# Patient Record
Sex: MALE | Race: WHITE | NOT HISPANIC OR LATINO | Employment: FULL TIME | URBAN - METROPOLITAN AREA
[De-identification: names, ages, dates, MRNs, and addresses within clinical notes are randomized per-mention and may not be internally consistent; named-entity substitution may affect disease eponyms.]

---

## 2017-01-16 DIAGNOSIS — I10 HTN (HYPERTENSION), BENIGN: ICD-10-CM

## 2017-01-18 RX ORDER — SIMVASTATIN 40 MG
TABLET ORAL
Qty: 90 TABLET | Refills: 3 | Status: SHIPPED | OUTPATIENT
Start: 2017-01-18 | End: 2017-02-14 | Stop reason: ALTCHOICE

## 2017-01-18 RX ORDER — HYDROCHLOROTHIAZIDE 25 MG/1
TABLET ORAL
Qty: 30 TABLET | Refills: 6 | Status: SHIPPED | OUTPATIENT
Start: 2017-01-18 | End: 2017-08-26 | Stop reason: SDUPTHER

## 2017-02-14 ENCOUNTER — OFFICE VISIT (OUTPATIENT)
Dept: FAMILY MEDICINE CLINIC | Facility: CLINIC | Age: 57
End: 2017-02-14

## 2017-02-14 VITALS
WEIGHT: 272.1 LBS | HEIGHT: 72 IN | HEART RATE: 84 BPM | BODY MASS INDEX: 36.85 KG/M2 | SYSTOLIC BLOOD PRESSURE: 142 MMHG | OXYGEN SATURATION: 97 % | DIASTOLIC BLOOD PRESSURE: 102 MMHG

## 2017-02-14 DIAGNOSIS — R79.89 LOW TESTOSTERONE: ICD-10-CM

## 2017-02-14 DIAGNOSIS — I10 HTN (HYPERTENSION), BENIGN: Primary | ICD-10-CM

## 2017-02-14 PROCEDURE — 99214 OFFICE O/P EST MOD 30 MIN: CPT | Performed by: INTERNAL MEDICINE

## 2017-02-14 RX ORDER — IRBESARTAN 300 MG/1
300 TABLET ORAL DAILY
Qty: 30 TABLET | Refills: 5 | Status: SHIPPED | OUTPATIENT
Start: 2017-02-14 | End: 2017-09-19 | Stop reason: SDUPTHER

## 2017-02-14 RX ORDER — ATORVASTATIN CALCIUM 20 MG/1
20 TABLET, FILM COATED ORAL DAILY
Qty: 30 TABLET | Refills: 5 | Status: SHIPPED | OUTPATIENT
Start: 2017-02-14 | End: 2017-08-26 | Stop reason: SDUPTHER

## 2017-02-14 RX ORDER — DILTIAZEM HYDROCHLORIDE 360 MG/1
360 CAPSULE, EXTENDED RELEASE ORAL DAILY
Qty: 30 CAPSULE | Refills: 5 | Status: SHIPPED | OUTPATIENT
Start: 2017-02-14 | End: 2017-06-27 | Stop reason: SDUPTHER

## 2017-05-10 DIAGNOSIS — E78.5 HYPERLIPIDEMIA, UNSPECIFIED HYPERLIPIDEMIA TYPE: ICD-10-CM

## 2017-05-10 DIAGNOSIS — I10 HTN (HYPERTENSION), BENIGN: Primary | ICD-10-CM

## 2017-05-15 ENCOUNTER — RESULTS ENCOUNTER (OUTPATIENT)
Dept: FAMILY MEDICINE CLINIC | Facility: CLINIC | Age: 57
End: 2017-05-15

## 2017-05-15 DIAGNOSIS — E78.5 HYPERLIPIDEMIA, UNSPECIFIED HYPERLIPIDEMIA TYPE: ICD-10-CM

## 2017-05-15 DIAGNOSIS — I10 HTN (HYPERTENSION), BENIGN: ICD-10-CM

## 2017-05-18 LAB
ALBUMIN SERPL-MCNC: 4.7 G/DL (ref 3.5–5.2)
ALBUMIN/GLOB SERPL: 1.7 G/DL
ALP SERPL-CCNC: 116 U/L (ref 39–117)
ALT SERPL-CCNC: 47 U/L (ref 1–41)
AST SERPL-CCNC: 31 U/L (ref 1–40)
BILIRUB SERPL-MCNC: 0.9 MG/DL (ref 0.1–1.2)
BUN SERPL-MCNC: 14 MG/DL (ref 6–20)
BUN/CREAT SERPL: 12.1 (ref 7–25)
CALCIUM SERPL-MCNC: 9.9 MG/DL (ref 8.6–10.5)
CHLORIDE SERPL-SCNC: 100 MMOL/L (ref 98–107)
CHOLEST SERPL-MCNC: 178 MG/DL (ref 0–200)
CO2 SERPL-SCNC: 24.3 MMOL/L (ref 22–29)
CREAT SERPL-MCNC: 1.16 MG/DL (ref 0.76–1.27)
GLOBULIN SER CALC-MCNC: 2.7 GM/DL
GLUCOSE SERPL-MCNC: 103 MG/DL (ref 65–99)
HDLC SERPL-MCNC: 48 MG/DL (ref 40–60)
LDLC SERPL CALC-MCNC: 99 MG/DL (ref 0–100)
POTASSIUM SERPL-SCNC: 3.9 MMOL/L (ref 3.5–5.2)
PROT SERPL-MCNC: 7.4 G/DL (ref 6–8.5)
SODIUM SERPL-SCNC: 140 MMOL/L (ref 136–145)
TRIGL SERPL-MCNC: 157 MG/DL (ref 0–150)
VLDLC SERPL CALC-MCNC: 31.4 MG/DL (ref 5–40)

## 2017-06-27 ENCOUNTER — OFFICE VISIT (OUTPATIENT)
Dept: FAMILY MEDICINE CLINIC | Facility: CLINIC | Age: 57
End: 2017-06-27

## 2017-06-27 VITALS
SYSTOLIC BLOOD PRESSURE: 146 MMHG | BODY MASS INDEX: 37.76 KG/M2 | HEART RATE: 92 BPM | WEIGHT: 278.8 LBS | OXYGEN SATURATION: 97 % | HEIGHT: 72 IN | DIASTOLIC BLOOD PRESSURE: 96 MMHG

## 2017-06-27 DIAGNOSIS — R06.83 SNORING: ICD-10-CM

## 2017-06-27 DIAGNOSIS — R40.0 DAYTIME SOMNOLENCE: ICD-10-CM

## 2017-06-27 DIAGNOSIS — R79.89 LOW TESTOSTERONE: ICD-10-CM

## 2017-06-27 DIAGNOSIS — I10 HTN (HYPERTENSION), BENIGN: Primary | ICD-10-CM

## 2017-06-27 PROCEDURE — 99213 OFFICE O/P EST LOW 20 MIN: CPT | Performed by: INTERNAL MEDICINE

## 2017-06-27 RX ORDER — DILTIAZEM HYDROCHLORIDE 240 MG/1
240 CAPSULE, COATED, EXTENDED RELEASE ORAL 2 TIMES DAILY
Qty: 60 CAPSULE | Refills: 5 | Status: SHIPPED | OUTPATIENT
Start: 2017-06-27 | End: 2018-01-02 | Stop reason: SDUPTHER

## 2017-08-14 ENCOUNTER — HOSPITAL ENCOUNTER (OUTPATIENT)
Dept: SLEEP MEDICINE | Facility: HOSPITAL | Age: 57
Discharge: HOME | End: 2017-08-14
Attending: INTERNAL MEDICINE | Admitting: INTERNAL MEDICINE

## 2017-08-14 ENCOUNTER — TRANSCRIBE ORDERS (OUTPATIENT)
Dept: PULMONOLOGY | Facility: HOSPITAL | Age: 57
End: 2017-08-14

## 2017-08-14 DIAGNOSIS — R06.83 SNORING: ICD-10-CM

## 2017-08-14 DIAGNOSIS — G47.10 HYPERSOMNIA: Primary | ICD-10-CM

## 2017-08-14 DIAGNOSIS — R40.0 DAYTIME SOMNOLENCE: ICD-10-CM

## 2017-08-14 PROCEDURE — G0463 HOSPITAL OUTPT CLINIC VISIT: HCPCS

## 2017-08-15 NOTE — CONSULTS
Group: [facility]         CONSULT NOTE    Patient Identification:  Melchor Conrad Jr.  56 y.o.  male  1960  [id number]            Requesting physician: Dr. delgado    Reason for Consultation:  Hypersomnia evaluation    CC:     History of Present Illness:  Very pleasant 56-year-old gentleman here for evaluation of excessive snoring and witnessed apnea.  Patient feels the last few years the symptoms have been worse.  Complains of feeling unrested and sleepy as the day progresses.  Reports positive weight gain of 30 pounds in the last 2 years.  No parasomnias reported.  Occasional alcohol use reported.  Snoring is in all positions.  Sleep schedule with Enterobacter and 30 gets up at 5:30 gets about 7 hours of sleep and still feels tired.      Review of Systems  [place] 6 out of 24 within normal limits  Review of system positive for nasal congestion painful joints cough rest of the 12 point review of system negative  Past Medical History:  Past Medical History:   Diagnosis Date   • Elbow fracture 09/2014   • HLD (hyperlipidemia) 7/5/2016   • HTN (hypertension), benign 7/5/2016   • Radial head fracture 09/2014       Past Surgical History:  Past Surgical History:   Procedure Laterality Date   • HAND RECONSTRUCTION Right    • HEMORRHOIDECTOMY     • SHOULDER SURGERY Left    • SPINAL FUSION  12/2011    Lower Back   • VASECTOMY          Home Meds:    (Not in a hospital admission)  ibersartin and torvastatin HCTZ and Cartia  Allergies:  No Known Allergies    Social History:   Social History     Social History   • Marital status:      Spouse name: N/A   • Number of children: N/A   • Years of education: N/A     Occupational History   • Not on file.     Social History Main Topics   • Smoking status: Former Smoker     Packs/day: 1.50     Years: 6.00   • Smokeless tobacco: Never Used   • Alcohol use 3.0 oz/week     5 Standard drinks or equivalent per week   • Drug use: No   • Sexual activity: Not on  file     Other Topics Concern   • Not on file     Social History Narrative       Family History:  Family History   Problem Relation Age of Onset   • Hypertension Mother    • Osteoarthritis Mother    • Prostate cancer Father    • Alzheimer's disease Father      SDAT   • Ulcers Father      PEPTIC       Physical Exam:  There were no vitals taken for this visit. There is no height or weight on file to calculate BMI.      Physical Exam  Middle-aged gentleman in no distress no labored breathing  ENT Mallampati between 3 and 4  Neck is supple no bruit no adenopathy  Chest clear   CVS regular rate and rhythm no murmurs or gallop  Abdomen is soft nontender bowel sounds positive  Extremities trace edema the sinuses no clubbing  CNS no deficits  No hallucination and delusional joint deformities or skin rashes    Lab Review:   LABS:  Lab Results   Component Value Date    CALCIUM 9.9 05/18/2017       No results found for: CKTOTAL, CKMB, CKMBINDEX, TROPONINI, TROPONINT                    Lab Results   Component Value Date    TSH 1.690 06/01/2016     CrCl cannot be calculated (Patient's most recent sCr result is older than the maximum 30 days allowed.).         Imaging: I personally visualized the images of scans/x-rays performed within last 3 days.      Assessment:  Hypersomnia with comorbidities of hypertension and obesity  Recommendations:  Based on clinical presentation sleep apnea is suspected  Discussed pathophysiology and consequences of untreated sleep apnea  Patient agreeable wishing to proceed for a home sleep study  Sleep hygiene measures are discussed in detail  Follow-up and make further recommendations after home sleep study.          Franc Doshi MD  8/14/2017  9:59 PM      Much of this encounter note is an electronic transcription/translation of spoken language to printed text using Dragon Software.

## 2017-08-26 DIAGNOSIS — I10 HTN (HYPERTENSION), BENIGN: ICD-10-CM

## 2017-08-28 RX ORDER — ATORVASTATIN CALCIUM 20 MG/1
TABLET, FILM COATED ORAL
Qty: 30 TABLET | Refills: 6 | Status: SHIPPED | OUTPATIENT
Start: 2017-08-28 | End: 2018-04-05 | Stop reason: SDUPTHER

## 2017-08-28 RX ORDER — HYDROCHLOROTHIAZIDE 25 MG/1
TABLET ORAL
Qty: 30 TABLET | Refills: 6 | Status: SHIPPED | OUTPATIENT
Start: 2017-08-28 | End: 2018-04-05 | Stop reason: SDUPTHER

## 2017-09-11 ENCOUNTER — HOSPITAL ENCOUNTER (OUTPATIENT)
Dept: SLEEP MEDICINE | Facility: HOSPITAL | Age: 57
Discharge: HOME | End: 2017-09-11
Admitting: INTERNAL MEDICINE

## 2017-09-11 DIAGNOSIS — G47.10 HYPERSOMNIA: ICD-10-CM

## 2017-09-11 PROCEDURE — 95806 SLEEP STUDY UNATT&RESP EFFT: CPT

## 2017-09-19 DIAGNOSIS — I10 HTN (HYPERTENSION), BENIGN: ICD-10-CM

## 2017-09-19 RX ORDER — IRBESARTAN 300 MG/1
TABLET ORAL
Qty: 30 TABLET | Refills: 0 | Status: SHIPPED | OUTPATIENT
Start: 2017-09-19 | End: 2017-10-25 | Stop reason: SDUPTHER

## 2017-09-25 ENCOUNTER — TELEPHONE (OUTPATIENT)
Dept: SLEEP MEDICINE | Facility: HOSPITAL | Age: 57
End: 2017-09-25

## 2017-09-29 ENCOUNTER — OFFICE VISIT (OUTPATIENT)
Dept: FAMILY MEDICINE CLINIC | Facility: CLINIC | Age: 57
End: 2017-09-29

## 2017-09-29 VITALS
BODY MASS INDEX: 37.46 KG/M2 | OXYGEN SATURATION: 98 % | HEART RATE: 78 BPM | SYSTOLIC BLOOD PRESSURE: 134 MMHG | WEIGHT: 276.6 LBS | HEIGHT: 72 IN | DIASTOLIC BLOOD PRESSURE: 92 MMHG

## 2017-09-29 DIAGNOSIS — M25.512 ACUTE PAIN OF LEFT SHOULDER: ICD-10-CM

## 2017-09-29 DIAGNOSIS — G47.33 OSA (OBSTRUCTIVE SLEEP APNEA): ICD-10-CM

## 2017-09-29 DIAGNOSIS — I10 HTN (HYPERTENSION), BENIGN: Primary | ICD-10-CM

## 2017-09-29 PROCEDURE — 99214 OFFICE O/P EST MOD 30 MIN: CPT | Performed by: INTERNAL MEDICINE

## 2017-09-29 NOTE — PROGRESS NOTES
Subjective   Melchor Conrad Jr. is a 57 y.o. male who presents today for:    Hypertension (3 month f/u)    Hypertension   This is a chronic problem. The current episode started more than 1 year ago. The problem has been waxing and waning since onset. The problem is controlled. Pertinent negatives include no anxiety, blurred vision, chest pain, headaches, malaise/fatigue, neck pain, orthopnea, palpitations, peripheral edema, PND, shortness of breath or sweats. Agents associated with hypertension include NSAIDs. Risk factors for coronary artery disease include obesity and stress. Compliance problems include exercise.       He has had elevated BP since I first saw him in 2003 shortly after being turned away from donating blood. He has been on atenolol, Avapro, HCTZ, and lisinopril. He had been on atenolol and Zetoretic but developed a dry cough that resolved once the ACE-I was stopped. No problems with the BP med changes (from lisinopril to irbesartan, in 10/216), nor with a change in atenolol to Diltiazem (to see if his energy levels would improve off the B-blocker). It turned out he had a testosterone deficiency; energy levels did not improve with the medication change.       Readings from blood pressure checks at home were reviewed with him today.  Results of a recent sleep study showing severe MADHU were also reviewed; treatment is on the horizon (next Wednesday).    Left shoulder pain: This is a new problem.  He reports onset around May, after using it more following his right elbow repair.  He has a h/o arthroscopy of the right shoulder for arthritis.  He recalls reaching behind him and injuring the left GHJ in May, with persistent, daily pain since then, worse with certain movements.  No recent evaluation. He has not taken meloxicam.  He felt better with topical Voltaren that had been prescribed for other joint pains.    Mr. Conrad  reports that he has quit smoking. He has a 9.00 pack-year smoking history. He  "has never used smokeless tobacco. He reports that he drinks about 3.0 oz of alcohol per week  He reports that he does not use illicit drugs.     No Known Allergies    Current Outpatient Prescriptions:   •  Ascorbic Acid (VITAMIN C PO), Take  by mouth daily., Disp: , Rfl:   •  aspirin 81 MG EC tablet, Take 81 mg by mouth daily., Disp: , Rfl:   •  atorvastatin (LIPITOR) 20 MG tablet, TAKE ONE TABLET BY MOUTH DAILY *REPLACING SIMVASTATIN*, Disp: 30 tablet, Rfl: 6  •  diltiaZEM CD (CARDIZEM CD) 240 MG 24 hr capsule, Take 1 capsule by mouth 2 (Two) Times a Day., Disp: 60 capsule, Rfl: 5  •  diphenhydrAMINE (BENADRYL) 25 mg capsule, Take 50 mg by mouth every night., Disp: , Rfl:   •  GARLIC PO, Take  by mouth daily., Disp: , Rfl:   •  hydrochlorothiazide (HYDRODIURIL) 25 MG tablet, TAKE ONE TABLET BY MOUTH DAILY, Disp: 30 tablet, Rfl: 6  •  irbesartan (AVAPRO) 300 MG tablet, TAKE ONE TABLET BY MOUTH DAILY, Disp: 30 tablet, Rfl: 0  •  Multiple Vitamins-Minerals (MULTIVITAL PO), Take  by mouth., Disp: , Rfl:   •  meloxicam (MOBIC) 15 MG tablet, Take 15 mg by mouth As Needed., Disp: , Rfl:   •  montelukast (SINGULAIR) 10 MG tablet, Take 10 mg by mouth Daily., Disp: , Rfl:       Review of Systems   Constitutional: Negative for malaise/fatigue.   Eyes: Negative for blurred vision.   Respiratory: Positive for apnea. Negative for shortness of breath.    Cardiovascular: Negative for chest pain, palpitations, orthopnea and PND.   Musculoskeletal: Negative for neck pain.   Neurological: Negative for headaches.       Objective   Vitals:    09/29/17 1140   BP: 134/92   BP Location: Right arm   Patient Position: Sitting   Cuff Size: Large Adult   Pulse: 78   SpO2: 98%   Weight: 276 lb 9.6 oz (125 kg)   Height: 72\" (182.9 cm)     Physical Exam  Obese, in no acute distress.  Mallampati 4 airway.  Regular rate and rhythm.  No murmur.  No lower extremity edema and pedal pulses are full bilaterally.    Left GHJ: No erythema, warmth, or " effusion.  Minimally tender to palpation anteriorly.  Pain with full abduction and with forced adduction.  Minimal pain with abduction to 90° versus resistance.  Mild-moderate pain with empty can test; no give-way.  Marked pain with external rotation versus resistance with the arm at 0°.    Assessment/Plan   Melchor was seen today for hypertension.    Diagnoses and all orders for this visit:    HTN (hypertension), benign    MADHU (obstructive sleep apnea)    Acute pain of left shoulder- new  -     diclofenac (VOLTAREN) 1 % gel gel; Apply 4 g topically 4 (Four) Times a Day As Needed (left shoulder pain).    Blood pressure: Not well controlled at this point, but we will make no medication changes.  Instead, we will see how he does with initiation of BiPAP next week.  He knows to contact me if symptomatically hypotension develop.  Otherwise, follow-up in 4 months.  Next    We will recheck his cholesterol levels prior to his follow-up in 4 months as well.    Sleep apnea treatment as noted above.    Left shoulder pain is consistent with a rotator cuff tendinitis, but other etiologies are also possible.  He will try the Voltaren gel consistently for the next 4-6 weeks.  If no better, he will contact his orthopedic surgeon for additional evaluation and treatment.

## 2017-10-09 ENCOUNTER — DOCUMENTATION (OUTPATIENT)
Dept: SLEEP MEDICINE | Facility: HOSPITAL | Age: 57
End: 2017-10-09

## 2017-10-25 DIAGNOSIS — I10 HTN (HYPERTENSION), BENIGN: ICD-10-CM

## 2017-10-25 RX ORDER — IRBESARTAN 300 MG/1
TABLET ORAL
Qty: 30 TABLET | Refills: 1 | Status: SHIPPED | OUTPATIENT
Start: 2017-10-25 | End: 2017-12-26 | Stop reason: SDUPTHER

## 2017-11-06 ENCOUNTER — OFFICE VISIT (OUTPATIENT)
Dept: FAMILY MEDICINE CLINIC | Facility: CLINIC | Age: 57
End: 2017-11-06

## 2017-11-06 VITALS
BODY MASS INDEX: 37.99 KG/M2 | WEIGHT: 280.5 LBS | TEMPERATURE: 98.2 F | DIASTOLIC BLOOD PRESSURE: 110 MMHG | HEART RATE: 78 BPM | SYSTOLIC BLOOD PRESSURE: 138 MMHG | HEIGHT: 72 IN | OXYGEN SATURATION: 99 %

## 2017-11-06 DIAGNOSIS — M25.461 PAIN AND SWELLING OF RIGHT KNEE: ICD-10-CM

## 2017-11-06 DIAGNOSIS — M25.561 PAIN AND SWELLING OF RIGHT KNEE: ICD-10-CM

## 2017-11-06 DIAGNOSIS — M25.561 RIGHT KNEE PAIN, UNSPECIFIED CHRONICITY: Primary | ICD-10-CM

## 2017-11-06 PROCEDURE — 73560 X-RAY EXAM OF KNEE 1 OR 2: CPT | Performed by: NURSE PRACTITIONER

## 2017-11-06 PROCEDURE — 99214 OFFICE O/P EST MOD 30 MIN: CPT | Performed by: NURSE PRACTITIONER

## 2017-11-06 NOTE — PROGRESS NOTES
Subjective   Melchor Conrad Jr. is a 57 y.o. male who presents today for:    Knee Pain (Rt knee pain and swelling mainly after sitting for longer periods of time. No injury)    HPI Comments: Mr. Conrad presents today complaining of pain and swelling to the right knee x 3 weeks. He states that when the symptoms began, the pain was present only after long periods of sitting, and went away with walking. He states that the pain/swelling/stiffness have gradually worsened, and the pain is now present constantly, and is affecting his gait. He reports going on a three-mile hike one week ago, which exacerbated his pain greatly. He does have a history of arthritis to bilateral shoulders, for which he sees an orthopedist, and a history of ACL/MCL repair to the affected (right) knee in 1994. Treatments tried include Meloxicam x 1 dose, ice, TENS unit use, elevation, and diclofenac gel. He states that the treatments have provided only short-term mild relief, and that when he begins walking, the pain comes back immediately. Denies numbness/tingling to BLE, and states that the swelling to the knee is unilateral, to the right side only.      I have reviewed the patient's medical history in detail and updated the computerized patient record.      Mr. Conrad  reports that he has quit smoking. He has a 9.00 pack-year smoking history. He has never used smokeless tobacco. He reports that he drinks about 3.0 oz of alcohol per week  He reports that he does not use illicit drugs.     No Known Allergies    Current Outpatient Prescriptions:   •  Ascorbic Acid (VITAMIN C PO), Take  by mouth daily., Disp: , Rfl:   •  aspirin 81 MG EC tablet, Take 81 mg by mouth daily., Disp: , Rfl:   •  atorvastatin (LIPITOR) 20 MG tablet, TAKE ONE TABLET BY MOUTH DAILY *REPLACING SIMVASTATIN*, Disp: 30 tablet, Rfl: 6  •  diclofenac (VOLTAREN) 1 % gel gel, Apply 4 g topically 4 (Four) Times a Day As Needed (left shoulder pain)., Disp: 200 g, Rfl: 5  •   "diltiaZEM CD (CARDIZEM CD) 240 MG 24 hr capsule, Take 1 capsule by mouth 2 (Two) Times a Day., Disp: 60 capsule, Rfl: 5  •  diphenhydrAMINE (BENADRYL) 25 mg capsule, Take 50 mg by mouth every night., Disp: , Rfl:   •  GARLIC PO, Take  by mouth daily., Disp: , Rfl:   •  hydrochlorothiazide (HYDRODIURIL) 25 MG tablet, TAKE ONE TABLET BY MOUTH DAILY, Disp: 30 tablet, Rfl: 6  •  irbesartan (AVAPRO) 300 MG tablet, TAKE ONE TABLET BY MOUTH DAILY, Disp: 30 tablet, Rfl: 1  •  montelukast (SINGULAIR) 10 MG tablet, Take 10 mg by mouth Daily., Disp: , Rfl:   •  Multiple Vitamins-Minerals (MULTIVITAL PO), Take  by mouth., Disp: , Rfl:       Review of Systems   Constitutional: Negative for fatigue and fever.   HENT: Negative for congestion, postnasal drip, rhinorrhea, sinus pain, sinus pressure, sore throat and tinnitus.    Eyes: Negative for visual disturbance.   Respiratory: Negative for cough, chest tightness, shortness of breath and wheezing.    Cardiovascular: Positive for leg swelling (right knee, unilateral). Negative for chest pain and palpitations.   Gastrointestinal: Negative for abdominal distention, abdominal pain, constipation, diarrhea, nausea and vomiting.   Endocrine: Negative for polydipsia, polyphagia and polyuria.   Genitourinary: Negative for difficulty urinating, dysuria, frequency and urgency.   Musculoskeletal: Positive for arthralgias (right knee), gait problem (limping d/t right knee pain) and joint swelling (right knee). Negative for back pain.   Skin: Negative.  Negative for color change and rash.   Psychiatric/Behavioral: Negative.          Objective   Vitals:    11/06/17 1116   BP: (!) 138/110   BP Location: Right arm   Patient Position: Sitting   Cuff Size: Large Adult   Pulse: 78   Temp: 98.2 °F (36.8 °C)   TempSrc: Oral   SpO2: 99%   Weight: 280 lb 8 oz (127 kg)   Height: 72\" (182.9 cm)     Physical Exam   Constitutional: He is oriented to person, place, and time. He appears well-developed and " well-nourished.   HENT:   Head: Normocephalic.   Eyes: EOM are normal. Pupils are equal, round, and reactive to light.   Neck: Normal range of motion. Neck supple.   Cardiovascular: Normal rate, regular rhythm and normal heart sounds.    Pulmonary/Chest: Effort normal and breath sounds normal. No respiratory distress.   Musculoskeletal: He exhibits edema and tenderness.        Right knee: He exhibits swelling and bony tenderness. Tenderness found. Medial joint line tenderness noted.   Neurological: He is alert and oriented to person, place, and time.   Skin: Skin is warm and dry. No rash noted. No erythema.   Psychiatric:   No acute abnormality   Vitals reviewed.        Assessment/Plan   Melchor was seen today for knee pain.    Diagnoses and all orders for this visit:    Right knee pain, unspecified chronicity  -     XR Knee 1 or 2 View Right    Pain and swelling of right knee    1. Ordered and performed x-ray of right knee. Will await radiologic interpretation, but there appears to be a bone spur/ prominent bony abnormality to the medial knee joint, at the point in which the patient states his pain is the worst. Advised patient to make an appointment with his current orthopedist for further evaluation and treatment of this abnormality. I have no other films to compare this x-ray to.    2. Advised patient to take an OTC anti-inflammatory, such as Aleve, consistently BID until he sees his orthopedist, to minimize discomfort from associated inflammation/swelling. Advised him to continue ice and elevation of the affected knee to minimize pain, as well.     3. Follow up as needed, and at next scheduled office visit with Dr. Ovalle.

## 2017-11-27 ENCOUNTER — OFFICE VISIT (OUTPATIENT)
Dept: SLEEP MEDICINE | Facility: HOSPITAL | Age: 57
End: 2017-11-27
Attending: INTERNAL MEDICINE

## 2017-11-27 VITALS
DIASTOLIC BLOOD PRESSURE: 94 MMHG | SYSTOLIC BLOOD PRESSURE: 155 MMHG | WEIGHT: 277 LBS | HEIGHT: 72 IN | HEART RATE: 86 BPM | BODY MASS INDEX: 37.52 KG/M2

## 2017-11-27 DIAGNOSIS — G47.33 OSA (OBSTRUCTIVE SLEEP APNEA): Primary | ICD-10-CM

## 2017-11-27 PROCEDURE — G0463 HOSPITAL OUTPT CLINIC VISIT: HCPCS

## 2017-11-27 NOTE — PROGRESS NOTES
"[facility]         Dr Franc Doshi  [unfilled]  Patient Care Team:  Corwin Ovalle MD as PCP - General (Internal Medicine)  HALINA Humphries MD as Consulting Physician (Orthopedic Surgery)  Fidel Whiting MD as Consulting Physician (Urology)    Chief Complaint: MADHU with comorbidities of hypertension    Interval History: Patient here for follow-up.  Currently set up on auto CPAP 8-20 cm.  Compliance 73% with average daily use of 5 hours 20 minutes.  Leak is acceptable AHI slightly high 5.3 events per hour.  Patient feels rested with the CPAP.  No alcohol or caffeine abuse.  Bedtime 10 PM PM awake time 5:30 AM.  No tobacco or alcohol or caffeine abuse.  Review of system negative.  [place] 5 out of 24 within normal limits.  Medications reviewed.    REVIEW OF SYSTEMS:   CARDIOVASCULAR: No chest pain, chest pressure or chest discomfort. No palpitations or edema.   RESPIRATORY: No shortness of breath, cough or sputum.   GASTROINTESTINAL: No anorexia, nausea, vomiting or diarrhea. No abdominal pain or blood.   HEMATOLOGIC: No bleeding or bruising.     Ventilator/Non-Invasive Ventilation Settings     None            Vital Signs  Heart Rate:  [86] 86  BP: (155)/(94) 155/94  [unfilled]  Flowsheet Rows         First Filed Value    Admission Height  72\" (182.9 cm) Documented at 11/27/2017 0854    Admission Weight  277 lb (126 kg) Documented at 11/27/2017 0854          Physical Exam:   General Appearance:    Alert, cooperative, in no acute distress  ENT Mallampati between 3 and 4    Lungs:     Clear to auscultation,respirations regular, even and                  unlabored    Heart:    Regular rhythm and normal rate, normal S1 and S2, no            murmur, no gallop, no rub, no click   Chest Wall:    No abnormalities observed   Abdomen:     Normal bowel sounds, no masses, no organomegaly, soft        non-tender, non-distended, no guarding, no rebound                tenderness   Extremities:   Moves all " extremities well, no edema, no cyanosis, no             redness     Results Review:                                          I reviewed the patient's new clinical results.  I personally viewed and interpreted the patient's CXR        Medication Review:         No current facility-administered medications for this visit.     ASSESSMENT:   Obstructive sleep apnea syndrome  Hypertension      PLAN:  Reviewed download with the patient.  We will adjust auto CPAP 9-14 cm  Weight loss encouraged  Sleep hygiene encouraged  Follow-up in 6 months          Franc Doshi MD  11/27/17  1:12 PM

## 2017-12-13 ENCOUNTER — OFFICE VISIT (OUTPATIENT)
Dept: FAMILY MEDICINE CLINIC | Facility: CLINIC | Age: 57
End: 2017-12-13

## 2017-12-13 VITALS
HEIGHT: 72 IN | WEIGHT: 280.2 LBS | SYSTOLIC BLOOD PRESSURE: 158 MMHG | OXYGEN SATURATION: 98 % | BODY MASS INDEX: 37.95 KG/M2 | DIASTOLIC BLOOD PRESSURE: 100 MMHG | HEART RATE: 77 BPM

## 2017-12-13 DIAGNOSIS — S83.206D TEAR OF MENISCUS OF RIGHT KNEE AS CURRENT INJURY, UNSPECIFIED MENISCUS, UNSPECIFIED TEAR TYPE, SUBSEQUENT ENCOUNTER: ICD-10-CM

## 2017-12-13 DIAGNOSIS — E78.5 HYPERLIPIDEMIA, UNSPECIFIED HYPERLIPIDEMIA TYPE: ICD-10-CM

## 2017-12-13 DIAGNOSIS — I10 HTN (HYPERTENSION), BENIGN: ICD-10-CM

## 2017-12-13 DIAGNOSIS — Z01.810 PRE-OPERATIVE CARDIOVASCULAR EXAMINATION: Primary | ICD-10-CM

## 2017-12-13 LAB
ALBUMIN SERPL-MCNC: 5 G/DL (ref 3.5–5.2)
ALBUMIN/GLOB SERPL: 1.9 G/DL
ALP SERPL-CCNC: 128 U/L (ref 39–117)
ALT SERPL-CCNC: 68 U/L (ref 1–41)
AST SERPL-CCNC: 38 U/L (ref 1–40)
BILIRUB SERPL-MCNC: 0.8 MG/DL (ref 0.1–1.2)
BUN SERPL-MCNC: 18 MG/DL (ref 6–20)
BUN/CREAT SERPL: 17.1 (ref 7–25)
CALCIUM SERPL-MCNC: 10.1 MG/DL (ref 8.6–10.5)
CHLORIDE SERPL-SCNC: 102 MMOL/L (ref 98–107)
CHOLEST SERPL-MCNC: 225 MG/DL (ref 0–200)
CO2 SERPL-SCNC: 27.8 MMOL/L (ref 22–29)
CREAT SERPL-MCNC: 1.05 MG/DL (ref 0.76–1.27)
ERYTHROCYTE [DISTWIDTH] IN BLOOD BY AUTOMATED COUNT: 12.1 % (ref 11.5–14.5)
GFR SERPLBLD CREATININE-BSD FMLA CKD-EPI: 73 ML/MIN/1.73
GFR SERPLBLD CREATININE-BSD FMLA CKD-EPI: 89 ML/MIN/1.73
GLOBULIN SER CALC-MCNC: 2.6 GM/DL
GLUCOSE SERPL-MCNC: 105 MG/DL (ref 65–99)
HCT VFR BLD AUTO: 50.3 % (ref 40.4–52.2)
HDLC SERPL-MCNC: 62 MG/DL (ref 40–60)
HGB BLD-MCNC: 17 G/DL (ref 13.7–17.6)
LDLC SERPL CALC-MCNC: 131 MG/DL (ref 0–100)
MCH RBC QN AUTO: 33.5 PG (ref 27–32.7)
MCHC RBC AUTO-ENTMCNC: 33.8 G/DL (ref 32.6–36.4)
MCV RBC AUTO: 99 FL (ref 79.8–96.2)
PLATELET # BLD AUTO: 274 10*3/MM3 (ref 140–500)
POTASSIUM SERPL-SCNC: 4.4 MMOL/L (ref 3.5–5.2)
PROT SERPL-MCNC: 7.6 G/DL (ref 6–8.5)
RBC # BLD AUTO: 5.08 10*6/MM3 (ref 4.6–6)
SODIUM SERPL-SCNC: 143 MMOL/L (ref 136–145)
TRIGL SERPL-MCNC: 162 MG/DL (ref 0–150)
VLDLC SERPL CALC-MCNC: 32.4 MG/DL (ref 5–40)
WBC # BLD AUTO: 8.14 10*3/MM3 (ref 4.5–10.7)

## 2017-12-13 PROCEDURE — 93000 ELECTROCARDIOGRAM COMPLETE: CPT | Performed by: INTERNAL MEDICINE

## 2017-12-13 PROCEDURE — 99213 OFFICE O/P EST LOW 20 MIN: CPT | Performed by: INTERNAL MEDICINE

## 2017-12-13 RX ORDER — MELOXICAM 15 MG/1
15 TABLET ORAL DAILY
COMMUNITY
Start: 2017-11-09 | End: 2019-06-01

## 2017-12-13 NOTE — PROGRESS NOTES
Subjective   Melchor Conrad Jr. is a 57 y.o. male who presents today for:    Knee Pain (Surgery Clearance)    History of Present Illness     He is scheduled to undergo knee arthroscopy for meniscal repair January 3, 2018 by Dr. Villavicencio.  He presents today for preop cardiovascular clearance.    He injured his knee November 1 while hiking in the [facility].  While hiking, he had no chest pain or shortness of breath/dyspnea on exertion.  He has daily pain with increased walking and especially with ascending/descending stairs and ladders.    Past medical history is significant for hyperlipidemia and hypertension.  Both had been fairly well controlled with his current regimen. He has noticed BP excursions since starting meloxicam for the BP.    Mr. Conrad  reports that he has quit smoking. He has a 9.00 pack-year smoking history. He has never used smokeless tobacco. He reports that he drinks about 3.0 oz of alcohol per week  He reports that he does not use illicit drugs.     No Known Allergies    Current Outpatient Prescriptions:   •  Ascorbic Acid (VITAMIN C PO), Take  by mouth daily., Disp: , Rfl:   •  aspirin 81 MG EC tablet, Take 81 mg by mouth daily., Disp: , Rfl:   •  atorvastatin (LIPITOR) 20 MG tablet, TAKE ONE TABLET BY MOUTH DAILY *REPLACING SIMVASTATIN*, Disp: 30 tablet, Rfl: 6  •  diclofenac (VOLTAREN) 1 % gel gel, Apply 4 g topically 4 (Four) Times a Day As Needed (left shoulder pain)., Disp: 200 g, Rfl: 5  •  diltiaZEM CD (CARDIZEM CD) 240 MG 24 hr capsule, Take 1 capsule by mouth 2 (Two) Times a Day., Disp: 60 capsule, Rfl: 5  •  diphenhydrAMINE (BENADRYL) 25 mg capsule, Take 50 mg by mouth every night., Disp: , Rfl:   •  GARLIC PO, Take  by mouth daily., Disp: , Rfl:   •  hydrochlorothiazide (HYDRODIURIL) 25 MG tablet, TAKE ONE TABLET BY MOUTH DAILY, Disp: 30 tablet, Rfl: 6  •  irbesartan (AVAPRO) 300 MG tablet, TAKE ONE TABLET BY MOUTH DAILY, Disp: 30 tablet, Rfl: 1  •  meloxicam (MOBIC)  "15 MG tablet, Take 15 mg by mouth Daily., Disp: , Rfl:   •  montelukast (SINGULAIR) 10 MG tablet, Take 10 mg by mouth Daily., Disp: , Rfl:   •  Multiple Vitamins-Minerals (MULTIVITAL PO), Take  by mouth., Disp: , Rfl:       Review of Systems   Constitutional: Negative for diaphoresis.   Eyes: Negative for visual disturbance.   Respiratory: Negative for cough and chest tightness.    Cardiovascular: Negative for chest pain, palpitations and leg swelling.   Gastrointestinal: Negative for abdominal pain.   Musculoskeletal: Positive for arthralgias. Negative for myalgias.   Neurological: Negative for dizziness, syncope, numbness and headaches.   Hematological: Does not bruise/bleed easily.       Objective   Vitals:    12/13/17 0913   BP: (!) 154/104   BP Location: Right arm   Patient Position: Sitting   Cuff Size: Large Adult   Pulse: 77   SpO2: 98%   Weight: 127 kg (280 lb 3.2 oz)   Height: 182.9 cm (72\")     Physical Exam   Constitutional: He is oriented to person, place, and time. He appears well-developed.   Obese   Eyes: Conjunctivae are normal. No scleral icterus.   Neck: Carotid bruit is not present.   Cardiovascular: Normal rate, regular rhythm and normal heart sounds.    Pulmonary/Chest: Effort normal and breath sounds normal.   Abdominal: Bowel sounds are normal. He exhibits no abdominal bruit.   Musculoskeletal:   Deferred.   Neurological: He is alert and oriented to person, place, and time. Coordination normal.   Psychiatric: He has a normal mood and affect. Cognition and memory are normal.         Assessment/Plan   Melchor was seen today for knee pain.    Diagnoses and all orders for this visit:    Pre-operative cardiovascular examination  -     ECG 12 Lead  -     CBC (No Diff)    Tear of meniscus of right knee as current injury, unspecified meniscus, unspecified tear type, subsequent encounter    HTN (hypertension), benign  -     ECG 12 Lead  -     Comprehensive Metabolic Panel  -     CBC (No " Diff)    Hyperlipidemia, unspecified hyperlipidemia type  -     ECG 12 Lead  -     Lipid Panel    From a cardiovascular standpoint, the patient is at an acceptable risk for the proposed right knee arthroscopy.  No further cardiovascular evaluation is indicated.    Today's ECG reveals a normal sinus rhythm with 2 premature supraventricular beats consistent with PACs.  Otherwise, the ECG is normal.    Labs will be done today for the preop testing and for continued surveillance of his hypertension and high cholesterol.  Further recommendations will follow once we see the results of those labs.    His blood pressure is elevated today, likely an interaction between the meloxicam and his irbesartan.  I suspect that his blood pressure will drop once he stops the meloxicam in advance of the surgery.  He will monitor his blood pressure at home and contact me if that is not the case.

## 2017-12-26 DIAGNOSIS — I10 HTN (HYPERTENSION), BENIGN: ICD-10-CM

## 2017-12-27 RX ORDER — IRBESARTAN 300 MG/1
TABLET ORAL
Qty: 30 TABLET | Refills: 5 | Status: SHIPPED | OUTPATIENT
Start: 2017-12-27 | End: 2018-06-27 | Stop reason: SDUPTHER

## 2018-01-02 DIAGNOSIS — I10 HTN (HYPERTENSION), BENIGN: ICD-10-CM

## 2018-01-02 RX ORDER — DILTIAZEM HYDROCHLORIDE 240 MG/1
CAPSULE, EXTENDED RELEASE ORAL
Qty: 60 CAPSULE | Refills: 4 | Status: SHIPPED | OUTPATIENT
Start: 2018-01-02 | End: 2018-06-27 | Stop reason: SDUPTHER

## 2018-04-05 DIAGNOSIS — I10 HTN (HYPERTENSION), BENIGN: ICD-10-CM

## 2018-04-06 RX ORDER — HYDROCHLOROTHIAZIDE 25 MG/1
TABLET ORAL
Qty: 30 TABLET | Refills: 5 | Status: SHIPPED | OUTPATIENT
Start: 2018-04-06 | End: 2018-11-08 | Stop reason: SDUPTHER

## 2018-04-06 RX ORDER — ATORVASTATIN CALCIUM 20 MG/1
TABLET, FILM COATED ORAL
Qty: 30 TABLET | Refills: 5 | Status: SHIPPED | OUTPATIENT
Start: 2018-04-06 | End: 2018-10-08 | Stop reason: SDUPTHER

## 2018-05-21 ENCOUNTER — OFFICE VISIT (OUTPATIENT)
Dept: SLEEP MEDICINE | Facility: HOSPITAL | Age: 58
End: 2018-05-21
Attending: INTERNAL MEDICINE

## 2018-05-21 VITALS
SYSTOLIC BLOOD PRESSURE: 146 MMHG | HEIGHT: 72 IN | WEIGHT: 264 LBS | BODY MASS INDEX: 35.76 KG/M2 | DIASTOLIC BLOOD PRESSURE: 88 MMHG | HEART RATE: 81 BPM

## 2018-05-21 DIAGNOSIS — G47.33 OSA ON CPAP: Primary | ICD-10-CM

## 2018-05-21 DIAGNOSIS — Z99.89 OSA ON CPAP: Primary | ICD-10-CM

## 2018-05-21 PROCEDURE — G0463 HOSPITAL OUTPT CLINIC VISIT: HCPCS

## 2018-05-21 NOTE — PROGRESS NOTES
"[facility] PULMONARY CARE         Dr Franc Doshi  [unfilled]  Patient Care Team:  Corwin Ovalle MD as PCP - General (Internal Medicine)  Melchor Humphries MD as Consulting Physician (Orthopedic Surgery)  Fidel Whiting MD as Consulting Physician (Urology)    Chief Complaint: MADHU comorbidities of hypertension.    Interval History:   Patient here for 6 months visit.  MADHU with CPAP auto 9-14 cm.  Compliance or days 53% with average daily use of 4 hours 22 minutes.  AHI and leak are acceptable.  Patient feels benefit from it.  Compliance has been reduced due to shoulder surgery with patient taking the mask off in his sleep.  He reports allergies with nasal stuffiness.  Currently getting allergy shots.  Bedtime 10:30 PM awake time 5:30 AM.  Gets about 6 hours of sleep and feels rested in the morning with CPAP.  No tobacco abuse now call abuse or caffeine abuse.  Currently has a full face mask that fits well.  REVIEW OF SYSTEMS:   CARDIOVASCULAR: No chest pain, chest pressure or chest discomfort. No palpitations or edema.   RESPIRATORY: No shortness of breath,  positive for cough   GASTROINTESTINAL: No anorexia, nausea, vomiting or diarrhea. No abdominal pain or blood.   HEMATOLOGIC: No bleeding or bruising.   [place] 1 out of 24 within normal limits    Ventilator/Non-Invasive Ventilation Settings     None            Vital Signs  Heart Rate:  [81] 81  BP: (146)/(88) 146/88  [unfilled]  Flowsheet Rows      First Filed Value   Admission Height  182.9 cm (72\") Documented at 05/21/2018 0700   Admission Weight  120 kg (264 lb) Documented at 05/21/2018 0700          Physical Exam:   General Appearance:    Alert, cooperative, in no acute distress  ENT Mallampati between 3 and 4    Lungs:     Clear to auscultation,respirations regular, even and                  unlabored    Heart:    Regular rhythm and normal rate, normal S1 and S2, no            murmur, no gallop, no rub, no click   Chest Wall:    No " abnormalities observed   Abdomen:     Normal bowel sounds, no masses, no organomegaly, soft        non-tender, non-distended, no guarding, no rebound                tenderness   Extremities:   Moves all extremities well, no edema, no cyanosis, no             redness     Results Review:                                          I reviewed the patient's new clinical results.  I personally viewed and interpreted the patient's CXR        Medication Review:         No current facility-administered medications for this visit.     ASSESSMENT:   MADHU with comorbidities of hypertension and allergic rhinitis    PLAN:   I believe the reason for reduced compliance of his severe allergies untreated  Advised patient to use over-the-counter nasal steroids and antihistamine  Continue to improve compliance  I believe auto CPAP 9-14 is a good pressure  Sleep hygiene measures reinforced  Weight loss and cut his  Follow-up in 6 months    Franc Doshi MD  05/21/18  9:03 AM

## 2018-06-26 DIAGNOSIS — I10 HTN (HYPERTENSION), BENIGN: ICD-10-CM

## 2018-06-26 RX ORDER — IRBESARTAN 300 MG/1
TABLET ORAL
Qty: 30 TABLET | Refills: 4 | OUTPATIENT
Start: 2018-06-26

## 2018-06-26 RX ORDER — DILTIAZEM HYDROCHLORIDE 240 MG/1
CAPSULE, EXTENDED RELEASE ORAL
Qty: 60 CAPSULE | Refills: 3 | OUTPATIENT
Start: 2018-06-26

## 2018-06-27 DIAGNOSIS — I10 HTN (HYPERTENSION), BENIGN: ICD-10-CM

## 2018-06-28 DIAGNOSIS — I10 HTN (HYPERTENSION), BENIGN: ICD-10-CM

## 2018-06-28 RX ORDER — IRBESARTAN 300 MG/1
TABLET ORAL
Qty: 30 TABLET | Refills: 4 | OUTPATIENT
Start: 2018-06-28

## 2018-06-28 RX ORDER — IRBESARTAN 300 MG/1
300 TABLET ORAL DAILY
Qty: 30 TABLET | Refills: 2 | Status: SHIPPED | OUTPATIENT
Start: 2018-06-28 | End: 2018-09-28 | Stop reason: SDUPTHER

## 2018-06-28 RX ORDER — DILTIAZEM HYDROCHLORIDE 240 MG/1
CAPSULE, EXTENDED RELEASE ORAL
Qty: 60 CAPSULE | Refills: 3 | OUTPATIENT
Start: 2018-06-28

## 2018-06-28 RX ORDER — DILTIAZEM HYDROCHLORIDE 240 MG/1
240 CAPSULE, COATED, EXTENDED RELEASE ORAL 2 TIMES DAILY
Qty: 60 CAPSULE | Refills: 2 | Status: SHIPPED | OUTPATIENT
Start: 2018-06-28 | End: 2018-10-16 | Stop reason: SDUPTHER

## 2018-09-28 DIAGNOSIS — I10 HTN (HYPERTENSION), BENIGN: ICD-10-CM

## 2018-09-28 RX ORDER — IRBESARTAN 300 MG/1
TABLET ORAL
Qty: 30 TABLET | Refills: 5 | Status: SHIPPED | OUTPATIENT
Start: 2018-09-28 | End: 2018-12-31 | Stop reason: SDUPTHER

## 2018-10-08 RX ORDER — ATORVASTATIN CALCIUM 20 MG/1
20 TABLET, FILM COATED ORAL DAILY
Qty: 30 TABLET | Refills: 12 | Status: SHIPPED | OUTPATIENT
Start: 2018-10-08 | End: 2019-06-01

## 2018-10-16 DIAGNOSIS — I10 HTN (HYPERTENSION), BENIGN: ICD-10-CM

## 2018-10-17 RX ORDER — DILTIAZEM HYDROCHLORIDE 240 MG/1
CAPSULE, EXTENDED RELEASE ORAL
Qty: 60 CAPSULE | Refills: 12 | Status: SHIPPED | OUTPATIENT
Start: 2018-10-17 | End: 2019-06-01

## 2018-11-06 ENCOUNTER — OFFICE VISIT (OUTPATIENT)
Dept: FAMILY MEDICINE CLINIC | Facility: CLINIC | Age: 58
End: 2018-11-06

## 2018-11-06 VITALS
HEART RATE: 82 BPM | SYSTOLIC BLOOD PRESSURE: 144 MMHG | DIASTOLIC BLOOD PRESSURE: 98 MMHG | WEIGHT: 272 LBS | OXYGEN SATURATION: 99 % | BODY MASS INDEX: 36.84 KG/M2 | HEIGHT: 72 IN

## 2018-11-06 DIAGNOSIS — R91.1 PULMONARY NODULE: ICD-10-CM

## 2018-11-06 DIAGNOSIS — I10 HTN (HYPERTENSION), BENIGN: Primary | ICD-10-CM

## 2018-11-06 DIAGNOSIS — E78.5 HYPERLIPIDEMIA, UNSPECIFIED HYPERLIPIDEMIA TYPE: ICD-10-CM

## 2018-11-06 PROCEDURE — 99213 OFFICE O/P EST LOW 20 MIN: CPT | Performed by: INTERNAL MEDICINE

## 2018-11-06 PROCEDURE — 93000 ELECTROCARDIOGRAM COMPLETE: CPT | Performed by: INTERNAL MEDICINE

## 2018-11-06 RX ORDER — FUROSEMIDE 20 MG/1
TABLET ORAL
Refills: 0 | COMMUNITY
Start: 2018-10-22 | End: 2019-06-01

## 2018-11-06 NOTE — PROGRESS NOTES
Subjective   Melchor Conrad Jr. is a 58 y.o. male who presents today for:    Hypertension    Hypertension   This is a chronic problem. The current episode started more than 1 year ago. The problem is unchanged. The problem is controlled. Associated symptoms include chest pain (sscp, pressure, intermittent). Pertinent negatives include no anxiety, blurred vision, headaches, malaise/fatigue, neck pain, orthopnea, palpitations, peripheral edema, PND, shortness of breath or sweats. Agents associated with hypertension include decongestants. Risk factors for coronary artery disease include dyslipidemia, obesity and stress. Compliance problems include exercise.       He has had elevated BP since I first saw him in 2003 shortly after being turned away from donating blood. He has been on atenolol, Avapro, HCTZ, and lisinopril. He had been on atenolol and Zetoretic but developed a dry cough that resolved once the ACE-I was stopped. No problems with the BP med changes (from lisinopril to irbesartan, in 10/216), nor with a change in atenolol to Diltiazem (to see if his energy levels would improve off the B-blocker). It turned out he had a testosterone deficiency; energy levels did not improve with the medication change.       Recent readings have been in the 120s/80s, with a few excursions to the 140/90 range.  He has recently started on a nasal steroid with Afrin mixed in, per his allergist.    He has had SSCP described as an intermittent pressure, vague and not focal, for the past 2--3 months. He has been on the nasal spray as noted above for about the same time frame.    Mr. Conrad  reports that he has quit smoking. He has a 9.00 pack-year smoking history. He has never used smokeless tobacco. He reports that he drinks about 3.0 oz of alcohol per week . He reports that he does not use drugs.     No Known Allergies    Current Outpatient Prescriptions:   •  Ascorbic Acid (VITAMIN C PO), Take  by mouth daily., Disp: , Rfl:  "  •  aspirin 81 MG EC tablet, Take 81 mg by mouth daily., Disp: , Rfl:   •  atorvastatin (LIPITOR) 20 MG tablet, Take 1 tablet by mouth Daily., Disp: 30 tablet, Rfl: 12  •  CARTIA  MG 24 hr capsule, TAKE ONE CAPSULE BY MOUTH TWICE A DAY, Disp: 60 capsule, Rfl: 12  •  diphenhydrAMINE (BENADRYL) 25 mg capsule, Take 50 mg by mouth every night., Disp: , Rfl:   •  furosemide (LASIX) 20 MG tablet, take one tablet daily in IN THE MORNING FOR water retention, STOP HCTZ while taking lasix, Disp: , Rfl: 0  •  GARLIC PO, Take  by mouth daily., Disp: , Rfl:   •  irbesartan (AVAPRO) 300 MG tablet, TAKE ONE TABLET BY MOUTH DAILY, Disp: 30 tablet, Rfl: 5  •  Misc Natural Products (CORTISOL MANAGER PO), take 1 Tablet by mouth every night 1-2 hours before bedtime, Disp: , Rfl: 11  •  montelukast (SINGULAIR) 10 MG tablet, Take 10 mg by mouth Daily., Disp: , Rfl:   •  Multiple Vitamins-Minerals (MULTIVITAL PO), Take  by mouth., Disp: , Rfl:   •  diclofenac (VOLTAREN) 1 % gel gel, Apply 4 g topically 4 (Four) Times a Day As Needed (left shoulder pain)., Disp: 200 g, Rfl: 5  •  hydrochlorothiazide (HYDRODIURIL) 25 MG tablet, TAKE ONE TABLET BY MOUTH DAILY, Disp: 30 tablet, Rfl: 5        Review of Systems   Constitutional: Negative for malaise/fatigue.   Eyes: Negative for blurred vision.   Respiratory: Negative for shortness of breath.    Cardiovascular: Positive for chest pain (sscp, pressure, intermittent). Negative for palpitations, orthopnea, leg swelling and PND.   Musculoskeletal: Negative for neck pain.   Neurological: Negative for headaches.         Objective   Vitals:    11/06/18 1439   BP: 144/98   BP Location: Left arm   Patient Position: Sitting   Cuff Size: Adult   Pulse: 82   SpO2: 99%   Weight: 123 kg (272 lb)   Height: 182.9 cm (72\")     Physical Exam   Constitutional: He is oriented to person, place, and time. He appears well-developed.   Overweight   Eyes: Conjunctivae are normal. No scleral icterus. "   Cardiovascular: Normal rate and regular rhythm.    Abdominal: Bowel sounds are normal. There is no tenderness.   Neurological: He is alert and oriented to person, place, and time.   Psychiatric: He has a normal mood and affect. His behavior is normal.            Melchor was seen today for hypertension.    Diagnoses and all orders for this visit:    HTN (hypertension), benign  -     ECG 12 Lead today shows sinus rhythm with left axis deviation and nonspecific ST-T changes.  There is no significant change from a tracing dated 12/13/17.    Hyperlipidemia, unspecified hyperlipidemia type    Pulmonary nodule    BP has been up and down. Go back on the HCTZ in place of the furosemide.  Recheck labs, incl the lipids.  Try to avoid the nasal spray with the Afrin.    Continue the atorvastatin.  He is due to have his labs rechecked.  He'll return, fasting, for this.    Recent report from a CT scan of the abdomen and pelvis to evaluate his kidney/kidney stones was reviewed today.  This did show a 6 mm right lower lobe nodule laterally that warrants further evaluation.  We will recheck a CT scan in  approximately 6 months.

## 2018-11-08 DIAGNOSIS — I10 HTN (HYPERTENSION), BENIGN: ICD-10-CM

## 2018-11-09 RX ORDER — HYDROCHLOROTHIAZIDE 25 MG/1
TABLET ORAL
Qty: 30 TABLET | Refills: 6 | Status: SHIPPED | OUTPATIENT
Start: 2018-11-09 | End: 2022-02-24 | Stop reason: ALTCHOICE

## 2018-11-19 ENCOUNTER — OFFICE VISIT (OUTPATIENT)
Dept: SLEEP MEDICINE | Facility: HOSPITAL | Age: 58
End: 2018-11-19
Attending: INTERNAL MEDICINE

## 2018-11-19 VITALS
HEIGHT: 72 IN | HEART RATE: 76 BPM | DIASTOLIC BLOOD PRESSURE: 86 MMHG | SYSTOLIC BLOOD PRESSURE: 145 MMHG | WEIGHT: 270 LBS | BODY MASS INDEX: 36.57 KG/M2

## 2018-11-19 DIAGNOSIS — Z99.89 OSA ON CPAP: Primary | ICD-10-CM

## 2018-11-19 DIAGNOSIS — G47.33 OSA ON CPAP: Primary | ICD-10-CM

## 2018-11-19 PROCEDURE — G0463 HOSPITAL OUTPT CLINIC VISIT: HCPCS

## 2018-11-19 NOTE — PROGRESS NOTES
"[facility] PULMONARY CARE         Dr Franc Doshi  [unfilled]  Patient Care Team:  Corwin Ovalle MD as PCP - General (Internal Medicine)  Melchor Humphries MD as Consulting Physician (Orthopedic Surgery)  Fidel Whiting MD as Consulting Physician (Urology)    Chief Complaint: Severe obstructive sleep apnea syndrome apnea index of 37.4 events per hour  Sleep-related hypoxemia lowest oxygen saturation of 62%    Interval History: Patient returns for a compliance visit.  He has known history of MADHU as above.  Currently on auto CPAP 9-14 cm.  Compliance remains poor at 27%.  He reports poor compliance due to mask issues.  When he uses the machine he feels better.  Bedtime 10:30 PM awake time 6 AM.  No tobacco no alcohol abuse.  Currently has a fullface mask that fits poorly.    REVIEW OF SYSTEMS:   CARDIOVASCULAR: No chest pain, chest pressure or chest discomfort. No palpitations or edema.   RESPIRATORY: No shortness of breath, cough or sputum.   GASTROINTESTINAL: No anorexia, nausea, vomiting or diarrhea. No abdominal pain or blood.   HEMATOLOGIC: No bleeding or bruising.   Positive for cough and nasal congestion  [place] 2 out of 24 within normal limits    Ventilator/Non-Invasive Ventilation Settings (From admission, onward)    None            Vital Signs  Heart Rate:  [76] 76  BP: (145)/(86) 145/86  [unfilled]  Flowsheet Rows      First Filed Value   Admission Height  182.9 cm (72\") Documented at 11/19/2018 0900   Admission Weight  122 kg (270 lb) Documented at 11/19/2018 0900          Physical Exam:   General Appearance:    Alert, cooperative, in no acute distress   Lungs:     Clear to auscultation,respirations regular, even and                  unlabored   ENT Mallampati between 3 and 4     Heart:    Regular rhythm and normal rate, normal S1 and S2, no            murmur, no gallop, no rub, no click   Chest Wall:    No abnormalities observed   Abdomen:     Normal bowel sounds, no masses, no " organomegaly, soft        non-tender, non-distended, no guarding, no rebound                tenderness   Extremities:   Moves all extremities well, no edema, no cyanosis, no             redness     Results Review:                                          I reviewed the patient's new clinical results.  I personally viewed and interpreted the patient's CXR        Medication Review:         No current facility-administered medications for this visit.     ASSESSMENT:   Severe MADHU  Hypertension  Allergic rhinitis      PLAN:  Reviewed compliance download  Mask refitting will be done at the sleep lab  Patient happy with the current pressure  Sleep hygiene measures  Treatment of underlying allergies  We'll follow-up in 6-8 weeks    Franc Doshi MD  11/19/18  11:59 AM

## 2018-11-19 NOTE — PROGRESS NOTES
Patient fit in clinic with Air Fit F30 Medium.  Pt's DME is [facility].  Pt would like mask ordered. devon

## 2018-12-01 ENCOUNTER — RESULTS ENCOUNTER (OUTPATIENT)
Dept: FAMILY MEDICINE CLINIC | Facility: CLINIC | Age: 58
End: 2018-12-01

## 2018-12-01 DIAGNOSIS — E78.5 HYPERLIPIDEMIA, UNSPECIFIED HYPERLIPIDEMIA TYPE: ICD-10-CM

## 2018-12-01 DIAGNOSIS — I10 HTN (HYPERTENSION), BENIGN: ICD-10-CM

## 2018-12-31 DIAGNOSIS — I10 HTN (HYPERTENSION), BENIGN: ICD-10-CM

## 2018-12-31 RX ORDER — IRBESARTAN 150 MG/1
300 TABLET ORAL DAILY
Qty: 60 TABLET | Refills: 6 | Status: SHIPPED | OUTPATIENT
Start: 2018-12-31 | End: 2019-06-01

## 2019-06-01 ENCOUNTER — APPOINTMENT (OUTPATIENT)
Dept: GENERAL RADIOLOGY | Facility: HOSPITAL | Age: 59
End: 2019-06-01

## 2019-06-01 ENCOUNTER — HOSPITAL ENCOUNTER (EMERGENCY)
Facility: HOSPITAL | Age: 59
Discharge: HOME | End: 2019-06-01
Attending: EMERGENCY MEDICINE | Admitting: EMERGENCY MEDICINE

## 2019-06-01 VITALS
WEIGHT: 262 LBS | RESPIRATION RATE: 16 BRPM | BODY MASS INDEX: 35.49 KG/M2 | SYSTOLIC BLOOD PRESSURE: 178 MMHG | DIASTOLIC BLOOD PRESSURE: 105 MMHG | HEART RATE: 66 BPM | OXYGEN SATURATION: 93 % | HEIGHT: 72 IN | TEMPERATURE: 97.8 F

## 2019-06-01 DIAGNOSIS — J20.8 ACUTE BRONCHITIS DUE TO OTHER SPECIFIED ORGANISMS: Primary | ICD-10-CM

## 2019-06-01 DIAGNOSIS — B34.8 INFECTION DUE TO HUMAN METAPNEUMOVIRUS (HMPV): ICD-10-CM

## 2019-06-01 LAB
ALBUMIN SERPL-MCNC: 4.1 G/DL (ref 3.5–5.2)
ALBUMIN/GLOB SERPL: 1.5 G/DL
ALP SERPL-CCNC: 82 U/L (ref 39–117)
ALT SERPL W P-5'-P-CCNC: 88 U/L (ref 1–41)
ANION GAP SERPL CALCULATED.3IONS-SCNC: 17.3 MMOL/L
AST SERPL-CCNC: 70 U/L (ref 1–40)
B PARAPERT DNA SPEC QL NAA+PROBE: NOT DETECTED
B PERT DNA SPEC QL NAA+PROBE: NOT DETECTED
BASOPHILS # BLD AUTO: 0.04 10*3/MM3 (ref 0–0.2)
BASOPHILS NFR BLD AUTO: 0.3 % (ref 0–1.5)
BILIRUB SERPL-MCNC: 0.7 MG/DL (ref 0.2–1.2)
BUN BLD-MCNC: 20 MG/DL (ref 6–20)
BUN/CREAT SERPL: 17.4 (ref 7–25)
C PNEUM DNA NPH QL NAA+NON-PROBE: NOT DETECTED
CALCIUM SPEC-SCNC: 9.1 MG/DL (ref 8.6–10.5)
CHLORIDE SERPL-SCNC: 100 MMOL/L (ref 98–107)
CO2 SERPL-SCNC: 26.7 MMOL/L (ref 22–29)
CREAT BLD-MCNC: 1.15 MG/DL (ref 0.76–1.27)
DEPRECATED RDW RBC AUTO: 44.6 FL (ref 37–54)
EOSINOPHIL # BLD AUTO: 0.01 10*3/MM3 (ref 0–0.4)
EOSINOPHIL NFR BLD AUTO: 0.1 % (ref 0.3–6.2)
ERYTHROCYTE [DISTWIDTH] IN BLOOD BY AUTOMATED COUNT: 12.6 % (ref 12.3–15.4)
FLUAV H1 2009 PAND RNA NPH QL NAA+PROBE: NOT DETECTED
FLUAV H1 HA GENE NPH QL NAA+PROBE: NOT DETECTED
FLUAV H3 RNA NPH QL NAA+PROBE: NOT DETECTED
FLUAV SUBTYP SPEC NAA+PROBE: NOT DETECTED
FLUBV RNA ISLT QL NAA+PROBE: NOT DETECTED
GFR SERPL CREATININE-BSD FRML MDRD: 66 ML/MIN/1.73
GLOBULIN UR ELPH-MCNC: 2.7 GM/DL
GLUCOSE BLD-MCNC: 100 MG/DL (ref 65–99)
HADV DNA SPEC NAA+PROBE: NOT DETECTED
HCOV 229E RNA SPEC QL NAA+PROBE: NOT DETECTED
HCOV HKU1 RNA SPEC QL NAA+PROBE: NOT DETECTED
HCOV NL63 RNA SPEC QL NAA+PROBE: NOT DETECTED
HCOV OC43 RNA SPEC QL NAA+PROBE: NOT DETECTED
HCT VFR BLD AUTO: 52.7 % (ref 37.5–51)
HGB BLD-MCNC: 17.9 G/DL (ref 13–17.7)
HMPV RNA NPH QL NAA+NON-PROBE: DETECTED
HOLD SPECIMEN: NORMAL
HOLD SPECIMEN: NORMAL
HPIV1 RNA SPEC QL NAA+PROBE: NOT DETECTED
HPIV2 RNA SPEC QL NAA+PROBE: NOT DETECTED
HPIV3 RNA NPH QL NAA+PROBE: NOT DETECTED
HPIV4 P GENE NPH QL NAA+PROBE: NOT DETECTED
IMM GRANULOCYTES # BLD AUTO: 0.09 10*3/MM3 (ref 0–0.05)
IMM GRANULOCYTES NFR BLD AUTO: 0.8 % (ref 0–0.5)
LYMPHOCYTES # BLD AUTO: 2.39 10*3/MM3 (ref 0.7–3.1)
LYMPHOCYTES NFR BLD AUTO: 20.5 % (ref 19.6–45.3)
M PNEUMO IGG SER IA-ACNC: NOT DETECTED
MCH RBC QN AUTO: 32.8 PG (ref 26.6–33)
MCHC RBC AUTO-ENTMCNC: 34 G/DL (ref 31.5–35.7)
MCV RBC AUTO: 96.7 FL (ref 79–97)
MONOCYTES # BLD AUTO: 0.96 10*3/MM3 (ref 0.1–0.9)
MONOCYTES NFR BLD AUTO: 8.2 % (ref 5–12)
NEUTROPHILS # BLD AUTO: 8.18 10*3/MM3 (ref 1.7–7)
NEUTROPHILS NFR BLD AUTO: 70.1 % (ref 42.7–76)
NRBC BLD AUTO-RTO: 0 /100 WBC (ref 0–0.2)
PLATELET # BLD AUTO: 245 10*3/MM3 (ref 140–450)
PMV BLD AUTO: 10.1 FL (ref 6–12)
POTASSIUM BLD-SCNC: 3.5 MMOL/L (ref 3.5–5.2)
PROT SERPL-MCNC: 6.8 G/DL (ref 6–8.5)
RBC # BLD AUTO: 5.45 10*6/MM3 (ref 4.14–5.8)
RHINOVIRUS RNA SPEC NAA+PROBE: NOT DETECTED
RSV RNA NPH QL NAA+NON-PROBE: NOT DETECTED
SODIUM BLD-SCNC: 144 MMOL/L (ref 136–145)
WBC NRBC COR # BLD: 11.67 10*3/MM3 (ref 3.4–10.8)
WHOLE BLOOD HOLD SPECIMEN: NORMAL
WHOLE BLOOD HOLD SPECIMEN: NORMAL

## 2019-06-01 PROCEDURE — 87798 DETECT AGENT NOS DNA AMP: CPT | Performed by: PHYSICIAN ASSISTANT

## 2019-06-01 PROCEDURE — 87486 CHLMYD PNEUM DNA AMP PROBE: CPT | Performed by: PHYSICIAN ASSISTANT

## 2019-06-01 PROCEDURE — 94799 UNLISTED PULMONARY SVC/PX: CPT

## 2019-06-01 PROCEDURE — 71046 X-RAY EXAM CHEST 2 VIEWS: CPT

## 2019-06-01 PROCEDURE — 85025 COMPLETE CBC W/AUTO DIFF WBC: CPT | Performed by: PHYSICIAN ASSISTANT

## 2019-06-01 PROCEDURE — 87581 M.PNEUMON DNA AMP PROBE: CPT | Performed by: PHYSICIAN ASSISTANT

## 2019-06-01 PROCEDURE — 87040 BLOOD CULTURE FOR BACTERIA: CPT | Performed by: PHYSICIAN ASSISTANT

## 2019-06-01 PROCEDURE — 80053 COMPREHEN METABOLIC PANEL: CPT | Performed by: PHYSICIAN ASSISTANT

## 2019-06-01 PROCEDURE — 36415 COLL VENOUS BLD VENIPUNCTURE: CPT

## 2019-06-01 PROCEDURE — 87633 RESP VIRUS 12-25 TARGETS: CPT | Performed by: PHYSICIAN ASSISTANT

## 2019-06-01 PROCEDURE — 99284 EMERGENCY DEPT VISIT MOD MDM: CPT

## 2019-06-01 PROCEDURE — 94640 AIRWAY INHALATION TREATMENT: CPT

## 2019-06-01 RX ORDER — DEXTROMETHORPHAN HYDROBROMIDE AND PROMETHAZINE HYDROCHLORIDE 15; 6.25 MG/5ML; MG/5ML
5 SYRUP ORAL 4 TIMES DAILY PRN
Qty: 120 ML | Refills: 0 | Status: SHIPPED | OUTPATIENT
Start: 2019-06-01 | End: 2022-02-24 | Stop reason: ALTCHOICE

## 2019-06-01 RX ORDER — SODIUM CHLORIDE 0.9 % (FLUSH) 0.9 %
10 SYRINGE (ML) INJECTION AS NEEDED
Status: DISCONTINUED | OUTPATIENT
Start: 2019-06-01 | End: 2019-06-02 | Stop reason: HOSPADM

## 2019-06-01 RX ORDER — ALBUTEROL SULFATE 90 UG/1
2 AEROSOL, METERED RESPIRATORY (INHALATION) EVERY 4 HOURS PRN
Qty: 1 INHALER | Refills: 0 | Status: SHIPPED | OUTPATIENT
Start: 2019-06-01

## 2019-06-01 RX ORDER — TELMISARTAN 80 MG/1
80 TABLET ORAL DAILY
COMMUNITY
End: 2021-02-23

## 2019-06-01 RX ORDER — LEVOFLOXACIN 750 MG/1
750 TABLET ORAL DAILY
COMMUNITY
Start: 2019-05-31 | End: 2019-06-06

## 2019-06-01 RX ORDER — IPRATROPIUM BROMIDE AND ALBUTEROL SULFATE 2.5; .5 MG/3ML; MG/3ML
3 SOLUTION RESPIRATORY (INHALATION) ONCE
Status: COMPLETED | OUTPATIENT
Start: 2019-06-01 | End: 2019-06-01

## 2019-06-01 RX ORDER — BENZONATATE 100 MG/1
100 CAPSULE ORAL 3 TIMES DAILY PRN
COMMUNITY

## 2019-06-01 RX ORDER — NEBIVOLOL 10 MG/1
10 TABLET ORAL DAILY
COMMUNITY
End: 2021-02-23

## 2019-06-01 RX ORDER — ROSUVASTATIN CALCIUM 20 MG/1
20 TABLET, COATED ORAL DAILY
COMMUNITY
End: 2022-02-24 | Stop reason: ALTCHOICE

## 2019-06-01 RX ADMIN — SODIUM CHLORIDE 1000 ML: 9 INJECTION, SOLUTION INTRAVENOUS at 20:41

## 2019-06-01 RX ADMIN — IPRATROPIUM BROMIDE AND ALBUTEROL SULFATE 3 ML: .5; 3 SOLUTION RESPIRATORY (INHALATION) at 20:29

## 2019-06-01 NOTE — ED NOTES
"Pt c/o nonproductive cough, sinus congestion, and diarrhea since Sunday.  went to the [facility] clinic on Wednesday who gave him tessalon pearls, doxycycline, and steroid pack. States his doctor also called in a different antibiotic on Friday. States no improvement from these medicines.  has had a fever at home off and on with a high of 102*F,  has been taking Tylenol for it.  has SOB with \"coughing spells\" but denies SOB currently or with exertion. Denies any CP. Denies pulmonary hx except for pneumonia. Speaking in clear sentences with a strong, clear voice.    VSS, NAD, A&O x4. Respirations even and unlabored. Denies any other acute complaints. Call light in reach. Will continue to monitor. MD to treat.     Sabrina Carr, RN  06/01/19 1951    "

## 2019-06-02 NOTE — ED PROVIDER NOTES
MD ATTESTATION NOTE    The CLOTILDE and I have discussed this patient's history, physical exam, and treatment plan.  I have reviewed the documentation and personally had a face to face interaction with the patient. I affirm the documentation and agree with the treatment and plan.  The attached note describes my personal findings.    Patient reports 6 days of cough and congestion.  He was on doxycycline starting on Wednesday and then switched to Levaquin yesterday.  He states that his cough has not improved despite cough medicines.    On exam, patient is well-appearing.  Lungs are clear.  Regular rate and rhythm.    Plan: Obtain chest x-ray as this has not been performed yet in the past week.     Corwin Mayer II, MD  06/01/19 2028

## 2019-06-02 NOTE — DISCHARGE INSTRUCTIONS
Home, rest, medicine as directed, keep well hydrated, tylenol and ibuprofen as needed, home medicine as prescribed, follow up with PCP for recheck. Return to care with further concerns.

## 2019-06-02 NOTE — ED PROVIDER NOTES
EMERGENCY DEPARTMENT ENCOUNTER    CHIEF COMPLAINT  Chief Complaint: cough  History given by: pt  History limited by: nothing  Room Number: 30/30  PMD: Reyes, Rosenberg Acosta, MD      HPI:  Pt is a 58 y.o. male who presents complaining of worsening non productive cough beginning a week ago. Also c/o intermittent fever (97.8 at Triage), congestion, rhinorrhea, and diarrhea. Also c/o sore mouth, which pt says he experienced when he had pneumonia as a child. Denies vomiting, SOA, or CP. Pt was seen at the [facility] 4 days ago, told to come here if his sx do not improve. Was given Doxycycline, tessalon pearls, and steroid pack. 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 Hx of hypertension.    Duration: 1 week  Onset: gradual  Timing: constant  Location: pulmonary  Radiation: n/a  Quality: non productive  Intensity/Severity: moderate  Progression: worsening  Associated Symptoms: c/o intermittent fever (97.8 at Triage), sore mouth, congestion, rhinorrhea, and diarrhea.  Aggravating Factors: employees being sick  Alleviating Factors: none  Previous Episodes: none  Treatment before arrival: Doxycycline, tessalon pearls, and steroid pack    PAST MEDICAL HISTORY  Active Ambulatory Problems     Diagnosis Date Noted   • HTN (hypertension), benign 07/05/2016   • HLD (hyperlipidemia) 07/05/2016   • Low testosterone 10/25/2016   • MADHU (obstructive sleep apnea) 09/29/2017   • Pulmonary nodule 11/06/2018     Resolved Ambulatory Problems     Diagnosis Date Noted   • No Resolved Ambulatory Problems     Past Medical History:   Diagnosis Date   • Elbow fracture 09/2014   • HLD (hyperlipidemia) 7/5/2016   • HTN (hypertension), benign 7/5/2016   • MADHU (obstructive sleep apnea) 9/29/2017   • Radial head fracture 09/2014       PAST SURGICAL HISTORY  Past Surgical History:   Procedure Laterality Date   • HAND RECONSTRUCTION Right    • HEMORRHOIDECTOMY     • KNEE MENISCAL REPAIR Right 01/2018   • SHOULDER SURGERY Left     • SPINAL FUSION  12/2011    Lower Back   • VASECTOMY         FAMILY HISTORY  Family History   Problem Relation Age of Onset   • Hypertension Mother    • Osteoarthritis Mother    • Prostate cancer Father    • Alzheimer's disease Father         SDAT   • Ulcers Father         PEPTIC       SOCIAL HISTORY  Social History     Socioeconomic History   • Marital status:      Spouse name: Not on file   • Number of children: Not on file   • Years of education: Not on file   • Highest education level: Not on file   Tobacco Use   • Smoking status: Former Smoker     Packs/day: 1.50     Years: 6.00     Pack years: 9.00   • Smokeless tobacco: Never Used   Substance and Sexual Activity   • Alcohol use: Yes     Alcohol/week: 4.2 oz     Types: 7 Standard drinks or equivalent per week     Comment: bourbon   • Drug use: No   • Sexual activity: Defer       ALLERGIES  Patient has no known allergies.    REVIEW OF SYSTEMS  Review of Systems   Constitutional: Positive for fever. Negative for activity change and appetite change.   HENT: Positive for congestion and rhinorrhea. Negative for sore throat.         (+) sore mouth   Eyes: Negative.    Respiratory: Positive for cough. Negative for shortness of breath.    Cardiovascular: Negative for chest pain and leg swelling.   Gastrointestinal: Positive for diarrhea. Negative for abdominal pain and vomiting.   Endocrine: Negative.    Genitourinary: Negative for decreased urine volume and dysuria.   Musculoskeletal: Negative for neck pain.   Skin: Negative for rash and wound.   Allergic/Immunologic: Negative.    Neurological: Negative for weakness, numbness and headaches.   Hematological: Negative.    Psychiatric/Behavioral: Negative.    All other systems reviewed and are negative.      PHYSICAL EXAM  ED Triage Vitals   Temp Heart Rate Resp BP SpO2   06/01/19 1904 06/01/19 1904 06/01/19 1904 06/01/19 1959 06/01/19 1904   97.9 °F (36.6 °C) 74 18 (!) 189/122 95 %      Temp src Heart Rate  Source Patient Position BP Location FiO2 (%)   06/01/19 1947 06/01/19 1959 06/01/19 1959 06/01/19 1959 --   Oral Monitor Sitting Right arm        Physical Exam   Constitutional: He is oriented to person, place, and time. No distress.   HENT:   Head: Normocephalic and atraumatic.   Eyes: EOM are normal. Pupils are equal, round, and reactive to light.   Neck: Normal range of motion. Neck supple.   Cardiovascular: Normal rate, regular rhythm and normal heart sounds.   Pulmonary/Chest: Effort normal. No respiratory distress. He has wheezes. He has rhonchi.   Abdominal: Soft. There is no tenderness. There is no rebound and no guarding.   Musculoskeletal: Normal range of motion. He exhibits no edema.   Neurological: He is alert and oriented to person, place, and time. He has normal sensation and normal strength.   Skin: Skin is warm and dry.   Psychiatric: Mood and affect normal.   Nursing note and vitals reviewed.      LAB RESULTS  Lab Results (last 24 hours)     Procedure Component Value Units Date/Time    Blood Culture - Blood, Arm, Right [[id number]] Collected:  06/01/19 2027    Specimen:  Blood from Arm, Right Updated:  06/01/19 2034    CBC & Differential [[id number]] Collected:  06/01/19 2028    Specimen:  Blood Updated:  06/01/19 2042    Narrative:       The following orders were created for panel order CBC & Differential.  Procedure                               Abnormality         Status                     ---------                               -----------         ------                     CBC Auto Differential[[id number]]        Abnormal            Final result                 Please view results for these tests on the individual orders.    Comprehensive Metabolic Panel [[id number]]  (Abnormal) Collected:  06/01/19 2028    Specimen:  Blood Updated:  06/01/19 2105     Glucose 100 mg/dL      BUN 20 mg/dL      Creatinine 1.15 mg/dL      Sodium 144 mmol/L      Potassium 3.5 mmol/L      Chloride 100 mmol/L      CO2  26.7 mmol/L      Calcium 9.1 mg/dL      Total Protein 6.8 g/dL      Albumin 4.10 g/dL      ALT (SGPT) 88 U/L      AST (SGOT) 70 U/L      Alkaline Phosphatase 82 U/L      Total Bilirubin 0.7 mg/dL      eGFR Non African Amer 66 mL/min/1.73      Globulin 2.7 gm/dL      A/G Ratio 1.5 g/dL      BUN/Creatinine Ratio 17.4     Anion Gap 17.3 mmol/L     Narrative:       GFR Normal >60  Chronic Kidney Disease <60  Kidney Failure <15    Blood Culture - Blood, Arm, Left [[id number]] Collected:  06/01/19 2028    Specimen:  Blood from Arm, Left Updated:  06/01/19 2033    CBC Auto Differential [[id number]]  (Abnormal) Collected:  06/01/19 2028    Specimen:  Blood Updated:  06/01/19 2042     WBC 11.67 10*3/mm3      RBC 5.45 10*6/mm3      Hemoglobin 17.9 g/dL      Hematocrit 52.7 %      MCV 96.7 fL      MCH 32.8 pg      MCHC 34.0 g/dL      RDW 12.6 %      RDW-SD 44.6 fl      MPV 10.1 fL      Platelets 245 10*3/mm3      Neutrophil % 70.1 %      Lymphocyte % 20.5 %      Monocyte % 8.2 %      Eosinophil % 0.1 %      Basophil % 0.3 %      Immature Grans % 0.8 %      Neutrophils, Absolute 8.18 10*3/mm3      Lymphocytes, Absolute 2.39 10*3/mm3      Monocytes, Absolute 0.96 10*3/mm3      Eosinophils, Absolute 0.01 10*3/mm3      Basophils, Absolute 0.04 10*3/mm3      Immature Grans, Absolute 0.09 10*3/mm3      nRBC 0.0 /100 WBC     Respiratory Panel, PCR - Swab, Nasopharynx [[id number]]  (Abnormal) Collected:  06/01/19 2046    Specimen:  Swab from Nasopharynx Updated:  06/01/19 2204     ADENOVIRUS, PCR Not Detected     Coronavirus 229E Not Detected     Coronavirus HKU1 Not Detected     Coronavirus NL63 Not Detected     Coronavirus OC43 Not Detected     Human Metapneumovirus Detected     Human Rhinovirus/Enterovirus Not Detected     Influenza B PCR Not Detected     Parainfluenza Virus 1 Not Detected     Parainfluenza Virus 2 Not Detected     Parainfluenza Virus 3 Not Detected     Parainfluenza Virus 4 Not Detected     Bordetella pertussis pcr  Not Detected     Influenza A H1 2009 PCR Not Detected     Chlamydophila pneumoniae PCR Not Detected     Mycoplasma pneumo by PCR Not Detected     Influenza A PCR Not Detected     Influenza A H3 Not Detected     Influenza A H1 Not Detected     RSV, PCR Not Detected     Bordetella parapertussis PCR Not Detected          I ordered the above labs and reviewed the results    RADIOLOGY  XR Chest 2 View   Final Result   1. No active disease       This report was finalized on 6/1/2019 8:40 PM by Armando Ocasio M.D.               I ordered the above noted radiological studies. Interpreted by radiologist. Reviewed by me in PACS.       PROCEDURES  Procedures      PROGRESS AND CONSULTS     2011- Ordered XR Chest, Blood cultures, CMP, Resp panel, and CBC for further evaluation. Also ordered pt Duo-Neb, IVF, and IV Bolus for sx management.     2020- Discussed the pt with Dr. Mayer. After performing their own physical exam of the pt, they agreed with the plan of care.    2140- Rechecked pt. Pt is resting comfortably and says his breathing is improved. Notified pt of negative XR Chest. Discussed the plan to wait for results of Resp panel. Pt understands and agrees with the plan, all questions answered.    2205- Lab called to report pt as Metapneumovirus.    2213- Rechecked pt. Pt is resting comfortably. Notified pt of Metapneumovirus. Discussed the plan to discharge the pt home with prescriptions for inhaler and cough medicine. I instructed the pt to finish abx and steroids. Pt understands and agrees with the plan, all questions answered.        MEDICAL DECISION MAKING  Results were reviewed/discussed with the patient and they were also made aware of online access. Pt also made aware that some labs, such as cultures, will not be resulted during ER visit and follow up with PMD is necessary.     MDM  Number of Diagnoses or Management Options     Amount and/or Complexity of Data Reviewed  Clinical lab tests: reviewed and  ordered  Tests in the radiology section of CPT®: reviewed and ordered (XR Chest negative)  Decide to obtain previous medical records or to obtain history from someone other than the patient: yes  Discuss the patient with other providers: yes (Dr. Mayer)  Independent visualization of images, tracings, or specimens: yes           DIAGNOSIS  Final diagnoses:   Acute bronchitis due to other specified organisms   Infection due to human metapneumovirus (hMPV)       DISPOSITION  DISCHARGE    Patient discharged in stable condition.    Reviewed implications of results, diagnosis, meds, responsibility to follow up, warning signs and symptoms of possible worsening, potential complications and reasons to return to ER, including worsening sx.    Patient/Family voiced understanding of above instructions.    Discussed plan for discharge, as there is no emergent indication for admission. Patient referred to primary care provider for BP management due to today's BP. Pt/family is agreeable and understands need for follow up and repeat testing.  Pt is aware that discharge does not mean that nothing is wrong but it indicates no emergency is present that requires admission and they must continue care with follow-up as given below or physician of their choice.     FOLLOW-UP  Reyes, Rosenberg Acosta, MD  [address] [place] [address]  [phone]    Schedule an appointment as soon as possible for a visit            Medication List      New Prescriptions    albuterol sulfate  (90 Base) MCG/ACT inhaler  Commonly known as:  PROVENTIL HFA;VENTOLIN HFA;PROAIR HFA  Inhale 2 puffs Every 4 (Four) Hours As Needed for Wheezing.     promethazine-dextromethorphan 6.25-15 MG/5ML syrup  Commonly known as:  PROMETHAZINE-DM  Take 5 mL by mouth 4 (Four) Times a Day As Needed for Cough.              Latest Documented Vital Signs:  As of 12:20 AM  BP- (!) 178/105 HR- 66 Temp- 97.8 °F (36.6 °C) (Oral) O2 sat- 93%    --  Documentation  assistance provided by shan Horowitz for Haresh Ramos PA-C.  Information recorded by the todibcecilia was done at my direction and has been verified and validated by me.       Heather Horowitz  06/01/19 2215       Haresh Ramos PA  06/02/19 0020

## 2019-06-06 LAB
BACTERIA SPEC AEROBE CULT: NORMAL
BACTERIA SPEC AEROBE CULT: NORMAL

## 2021-02-23 ENCOUNTER — OFFICE VISIT (OUTPATIENT)
Dept: CARDIOLOGY | Facility: CLINIC | Age: 61
End: 2021-02-23

## 2021-02-23 VITALS
BODY MASS INDEX: 33.78 KG/M2 | SYSTOLIC BLOOD PRESSURE: 150 MMHG | HEART RATE: 75 BPM | DIASTOLIC BLOOD PRESSURE: 80 MMHG | WEIGHT: 249.4 LBS | HEIGHT: 72 IN

## 2021-02-23 DIAGNOSIS — I25.84 CORONARY ARTERY CALCIFICATION: Primary | ICD-10-CM

## 2021-02-23 DIAGNOSIS — G47.33 OSA (OBSTRUCTIVE SLEEP APNEA): ICD-10-CM

## 2021-02-23 DIAGNOSIS — I25.10 CORONARY ARTERY CALCIFICATION: Primary | ICD-10-CM

## 2021-02-23 DIAGNOSIS — E78.2 MIXED HYPERLIPIDEMIA: ICD-10-CM

## 2021-02-23 DIAGNOSIS — I71.20 THORACIC AORTIC ANEURYSM WITHOUT RUPTURE (HCC): ICD-10-CM

## 2021-02-23 DIAGNOSIS — I10 HTN (HYPERTENSION), BENIGN: ICD-10-CM

## 2021-02-23 PROCEDURE — 99204 OFFICE O/P NEW MOD 45 MIN: CPT | Performed by: INTERNAL MEDICINE

## 2021-02-23 PROCEDURE — 93000 ELECTROCARDIOGRAM COMPLETE: CPT | Performed by: INTERNAL MEDICINE

## 2021-02-23 NOTE — PROGRESS NOTES
Date of Office Visit: 2021  Encounter Provider: Lila Otero MD  Place of Service: [facility] CARDIOLOGY  Patient Name: Melchor Conrad Jr.  :1960      Patient ID:  Melchor Conrad Jr. is a 60 y.o. male is here for abnormal calcium score.           History of Present Illness    He has a history of hypertension, obstructive sleep apnea and hyperlipidemia.  He is here for an abnormal calcium score.    He is  and has 2 children.  He is a  as an  and works 50 to 70 hours/week and quit smoking .  He has tequila on the weekend weekend and 2 cups of coffee per day.  He does resistance band exercise 20 minutes almost daily.  There is no family history of premature cardiovascular disease.    He had a coronary calcium score done 2020 which showed 0 left main, 312 LAD, 102 left circumflex, 113 right coronary artery with a total score of 527 agatston.  In addition, there is a fusiform ascending aortic aneurysm measuring 4.2 cm.    Labs done 2020 show LDL 99, HDL 74, normal TSH, triglycerides 105, total cholesterol 193 and CMP was normal.  His sleep apnea is treated by Dr. Inocencia Gallego.  He uses a dental device for his sleep apnea.    He has had no tachycardia, dizziness, exertional chest tightness or pressure.  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  He has no orthopnea or PND.  He has no exertional dyspnea.  He has had no nausea, vomiting, syncope, strokelike symptoms.  He has lost 55 pounds over the last year.    He gets phlebotomy for high hemoglobin levels due to testosterone injections.    Past Medical History:   Diagnosis Date   • Elbow fracture 2014   • HLD (hyperlipidemia) 2016   • HTN (hypertension), benign 2016   • MADHU (obstructive sleep apnea) 2017   • Radial head fracture 2014         Past Surgical History:   Procedure Laterality Date    • HAND RECONSTRUCTION Right    • HEMORRHOIDECTOMY     • KNEE MENISCAL REPAIR Right 2018   • SHOULDER SURGERY Left    • SPINAL FUSION  2011    Lower Back   • VASECTOMY         Current Outpatient Medications on File Prior to Visit   Medication Sig Dispense Refill   • albuterol sulfate  (90 Base) MCG/ACT inhaler Inhale 2 puffs Every 4 (Four) Hours As Needed for Wheezing. 1 inhaler 0   • Ascorbic Acid (VITAMIN C PO) Take  by mouth daily.     • aspirin 81 MG EC tablet Take 81 mg by mouth daily.     • benzonatate (TESSALON) 100 MG capsule Take 100 mg by mouth 3 (Three) Times a Day As Needed for Cough.     • diphenhydrAMINE (BENADRYL) 25 mg capsule Take 50 mg by mouth every night.     • GARLIC PO Take  by mouth daily.     • hydrochlorothiazide (HYDRODIURIL) 25 MG tablet TAKE ONE TABLET BY MOUTH DAILY 30 tablet 6   • Multiple Vitamins-Minerals (MULTIVITAL PO) Take  by mouth.     • promethazine-dextromethorphan (PROMETHAZINE-DM) 6.25-15 MG/5ML syrup Take 5 mL by mouth 4 (Four) Times a Day As Needed for Cough. 120 mL 0   • rosuvastatin (CRESTOR) 20 MG tablet Take 20 mg by mouth Daily.     • [DISCONTINUED] Misc Natural Products (CORTISOL MANAGER PO) take 1 Tablet by mouth every night 1-2 hours before bedtime  11   • [DISCONTINUED] montelukast (SINGULAIR) 10 MG tablet Take 10 mg by mouth Daily.     • [DISCONTINUED] nebivolol (BYSTOLIC) 10 MG tablet Take 10 mg by mouth Daily.     • [DISCONTINUED] telmisartan (MICARDIS) 80 MG tablet Take 80 mg by mouth Daily.       No current facility-administered medications on file prior to visit.        Social History     Socioeconomic History   • Marital status:      Spouse name: Not on file   • Number of children: Not on file   • Years of education: Not on file   • Highest education level: Not on file   Tobacco Use   • Smoking status: Former Smoker     Packs/day: 1.50     Years: 6.00     Pack years: 9.00     Quit date:      Years since quittin.1   • Smokeless  "tobacco: Never Used   • Tobacco comment: Caffeine use: 2 cups daily   Substance and Sexual Activity   • Alcohol use: Yes     Alcohol/week: 7.0 standard drinks     Types: 7 Standard drinks or equivalent per week     Comment: Weekend/tequilla   • Drug use: No   • Sexual activity: Defer           Review of Systems   Constitution: Negative.   HENT: Negative for congestion.    Eyes: Negative for vision loss in left eye and vision loss in right eye.   Respiratory: Negative.  Negative for cough, hemoptysis, shortness of breath, sleep disturbances due to breathing, snoring, sputum production and wheezing.    Endocrine: Negative.    Hematologic/Lymphatic: Negative.    Skin: Negative for poor wound healing and rash.   Musculoskeletal: Negative for falls, gout, muscle cramps and myalgias.   Gastrointestinal: Negative for abdominal pain, diarrhea, dysphagia, hematemesis, melena, nausea and vomiting.   Neurological: Negative for excessive daytime sleepiness, dizziness, headaches, light-headedness, loss of balance, seizures and vertigo.   Psychiatric/Behavioral: Negative for depression and substance abuse. The patient is not nervous/anxious.        Procedures    ECG 12 Lead    Date/Time: 2/23/2021 2:09 PM  Performed by: Lila Otero MD  Authorized by: Lila Otero MD   Comparison: not compared with previous ECG   Previous ECG: no previous ECG available  Rhythm: sinus rhythm    Clinical impression: normal ECG                Objective:      Vitals:    02/23/21 1353 02/23/21 1359   BP: 160/80 150/80   BP Location: Left arm Right arm   Pulse: 75    Weight: 113 kg (249 lb 6.4 oz)    Height: 182.9 cm (72\")      Body mass index is 33.82 kg/m².    Vitals signs reviewed.   Constitutional:       General: Not in acute distress.     Appearance: Well-developed. Not diaphoretic.   Eyes:      General: No scleral icterus.     Conjunctiva/sclera: Conjunctivae normal.   HENT:      Head: Normocephalic and atraumatic.   Neck:      " Musculoskeletal: Neck supple.      Thyroid: No thyromegaly.      Vascular: No carotid bruit or JVD.      Lymphadenopathy: No cervical adenopathy.   Pulmonary:      Effort: Pulmonary effort is normal. No respiratory distress.      Breath sounds: Normal breath sounds. No wheezing. No rhonchi. No rales.   Chest:      Chest wall: Not tender to palpatation.   Cardiovascular:      Normal rate. Regular rhythm.      Murmurs: There is no murmur.      No gallop.   Pulses:     Intact distal pulses.   Edema:     Peripheral edema absent.   Abdominal:      General: Bowel sounds are normal. There is no distension or abdominal bruit.      Palpations: Abdomen is soft. There is no abdominal mass.      Tenderness: There is no abdominal tenderness.   Musculoskeletal:         General: No deformity.      Extremities: No clubbing present.  Skin:     General: Skin is warm and dry. There is no cyanosis.      Coloration: Skin is not pale.      Findings: No rash.   Neurological:      Mental Status: Alert and oriented to person, place, and time.      Cranial Nerves: No cranial nerve deficit.   Psychiatric:         Judgment: Judgment normal.         Lab Review:       Assessment:      Diagnosis Plan   1. Coronary artery calcification     2. HTN (hypertension), benign  Adult Transthoracic Echo Complete W/ Cont if Necessary Per Protocol   3. Mixed hyperlipidemia  Adult Transthoracic Echo Complete W/ Cont if Necessary Per Protocol   4. MADHU (obstructive sleep apnea)     5. Thoracic aortic aneurysm without rupture (CMS/HCC)  Adult Transthoracic Echo Complete W/ Cont if Necessary Per Protocol    CT Angiogram Chest     1. High calcium score.  Continue rosuvastatin.   2. Dilated thoracic aorta, set up gated CTA chest.   3. MADHU on dental device.   4. Hyperlipidemia.   5. Hypertension, goal <120/80.      Plan:       No medication changes for now.  Set up testing.  If his aorta is dilated, may need beta-blocker.  Remain on aspirin and would not discontinue  rosuvastatin based on his coronary calcium score.

## 2021-03-12 ENCOUNTER — HOSPITAL ENCOUNTER (OUTPATIENT)
Dept: CARDIOLOGY | Facility: HOSPITAL | Age: 61
Discharge: HOME | End: 2021-03-12
Admitting: INTERNAL MEDICINE

## 2021-03-12 VITALS
HEIGHT: 71 IN | DIASTOLIC BLOOD PRESSURE: 92 MMHG | SYSTOLIC BLOOD PRESSURE: 140 MMHG | WEIGHT: 249 LBS | BODY MASS INDEX: 34.86 KG/M2 | HEART RATE: 78 BPM

## 2021-03-12 DIAGNOSIS — I10 HTN (HYPERTENSION), BENIGN: ICD-10-CM

## 2021-03-12 DIAGNOSIS — E78.2 MIXED HYPERLIPIDEMIA: ICD-10-CM

## 2021-03-12 DIAGNOSIS — I71.20 THORACIC AORTIC ANEURYSM WITHOUT RUPTURE (HCC): ICD-10-CM

## 2021-03-12 PROCEDURE — 93306 TTE W/DOPPLER COMPLETE: CPT

## 2021-03-12 PROCEDURE — 93306 TTE W/DOPPLER COMPLETE: CPT | Performed by: INTERNAL MEDICINE

## 2021-03-15 ENCOUNTER — TELEPHONE (OUTPATIENT)
Dept: CARDIOLOGY | Facility: CLINIC | Age: 61
End: 2021-03-15

## 2021-03-15 DIAGNOSIS — I71.20 THORACIC AORTIC ANEURYSM WITHOUT RUPTURE (HCC): ICD-10-CM

## 2021-03-15 DIAGNOSIS — I25.84 CORONARY ARTERY CALCIFICATION: Primary | ICD-10-CM

## 2021-03-15 DIAGNOSIS — I25.10 CORONARY ARTERY CALCIFICATION: Primary | ICD-10-CM

## 2021-03-15 LAB
AORTIC ARCH: 2.5 CM
ASCENDING AORTA: 4.2 CM
BH CV ECHO MEAS - ACS: 2 CM
BH CV ECHO MEAS - AO ARCH DIAM (PROXIMAL TRANS.): 2.5 CM
BH CV ECHO MEAS - AO MAX PG (FULL): 4.7 MMHG
BH CV ECHO MEAS - AO MAX PG: 9.2 MMHG
BH CV ECHO MEAS - AO MEAN PG (FULL): 2.1 MMHG
BH CV ECHO MEAS - AO MEAN PG: 5.1 MMHG
BH CV ECHO MEAS - AO ROOT AREA (BSA CORRECTED): 1.7
BH CV ECHO MEAS - AO ROOT AREA: 12.7 CM^2
BH CV ECHO MEAS - AO ROOT DIAM: 4 CM
BH CV ECHO MEAS - AO V2 MAX: 152 CM/SEC
BH CV ECHO MEAS - AO V2 MEAN: 105.5 CM/SEC
BH CV ECHO MEAS - AO V2 VTI: 29.2 CM
BH CV ECHO MEAS - ASC AORTA: 4.2 CM
BH CV ECHO MEAS - AVA(I,A): 2.4 CM^2
BH CV ECHO MEAS - AVA(I,D): 2.4 CM^2
BH CV ECHO MEAS - AVA(V,A): 2.5 CM^2
BH CV ECHO MEAS - AVA(V,D): 2.5 CM^2
BH CV ECHO MEAS - BSA(HAYCOCK): 2.4 M^2
BH CV ECHO MEAS - BSA: 2.3 M^2
BH CV ECHO MEAS - BZI_BMI: 33.8 KILOGRAMS/M^2
BH CV ECHO MEAS - BZI_METRIC_HEIGHT: 182.9 CM
BH CV ECHO MEAS - BZI_METRIC_WEIGHT: 112.9 KG
BH CV ECHO MEAS - EDV(MOD-SP2): 105 ML
BH CV ECHO MEAS - EDV(MOD-SP4): 91 ML
BH CV ECHO MEAS - EDV(TEICH): 97.8 ML
BH CV ECHO MEAS - EF(CUBED): 83.5 %
BH CV ECHO MEAS - EF(MOD-BP): 59.2 %
BH CV ECHO MEAS - EF(MOD-SP2): 60 %
BH CV ECHO MEAS - EF(MOD-SP4): 58.2 %
BH CV ECHO MEAS - EF(TEICH): 76.6 %
BH CV ECHO MEAS - ESV(MOD-SP2): 42 ML
BH CV ECHO MEAS - ESV(MOD-SP4): 38 ML
BH CV ECHO MEAS - ESV(TEICH): 22.9 ML
BH CV ECHO MEAS - FS: 45.2 %
BH CV ECHO MEAS - IVS/LVPW: 1
BH CV ECHO MEAS - IVSD: 1.3 CM
BH CV ECHO MEAS - LAT PEAK E' VEL: 9.5 CM/SEC
BH CV ECHO MEAS - LV DIASTOLIC VOL/BSA (35-75): 38.9 ML/M^2
BH CV ECHO MEAS - LV MASS(C)D: 230.6 GRAMS
BH CV ECHO MEAS - LV MASS(C)DI: 98.6 GRAMS/M^2
BH CV ECHO MEAS - LV MAX PG: 4.6 MMHG
BH CV ECHO MEAS - LV MEAN PG: 3 MMHG
BH CV ECHO MEAS - LV SYSTOLIC VOL/BSA (12-30): 16.3 ML/M^2
BH CV ECHO MEAS - LV V1 MAX: 106.7 CM/SEC
BH CV ECHO MEAS - LV V1 MEAN: 83.2 CM/SEC
BH CV ECHO MEAS - LV V1 VTI: 19.3 CM
BH CV ECHO MEAS - LVIDD: 4.6 CM
BH CV ECHO MEAS - LVIDS: 2.5 CM
BH CV ECHO MEAS - LVLD AP2: 9.1 CM
BH CV ECHO MEAS - LVLD AP4: 8.8 CM
BH CV ECHO MEAS - LVLS AP2: 7.5 CM
BH CV ECHO MEAS - LVLS AP4: 7.7 CM
BH CV ECHO MEAS - LVOT AREA (M): 3.5 CM^2
BH CV ECHO MEAS - LVOT AREA: 3.6 CM^2
BH CV ECHO MEAS - LVOT DIAM: 2.1 CM
BH CV ECHO MEAS - LVPWD: 1.3 CM
BH CV ECHO MEAS - MED PEAK E' VEL: 10 CM/SEC
BH CV ECHO MEAS - MV A DUR: 0.12 SEC
BH CV ECHO MEAS - MV A MAX VEL: 94.7 CM/SEC
BH CV ECHO MEAS - MV DEC SLOPE: 349.1 CM/SEC^2
BH CV ECHO MEAS - MV DEC TIME: 0.21 SEC
BH CV ECHO MEAS - MV E MAX VEL: 67.7 CM/SEC
BH CV ECHO MEAS - MV E/A: 0.71
BH CV ECHO MEAS - MV MAX PG: 4.6 MMHG
BH CV ECHO MEAS - MV MEAN PG: 1.8 MMHG
BH CV ECHO MEAS - MV P1/2T MAX VEL: 70.6 CM/SEC
BH CV ECHO MEAS - MV P1/2T: 59.2 MSEC
BH CV ECHO MEAS - MV V2 MAX: 107.2 CM/SEC
BH CV ECHO MEAS - MV V2 MEAN: 63.8 CM/SEC
BH CV ECHO MEAS - MV V2 VTI: 25.6 CM
BH CV ECHO MEAS - MVA P1/2T LCG: 3.1 CM^2
BH CV ECHO MEAS - MVA(P1/2T): 3.7 CM^2
BH CV ECHO MEAS - MVA(VTI): 2.7 CM^2
BH CV ECHO MEAS - PA ACC TIME: 0.19 SEC
BH CV ECHO MEAS - PA MAX PG (FULL): 2.2 MMHG
BH CV ECHO MEAS - PA MAX PG: 4.4 MMHG
BH CV ECHO MEAS - PA PR(ACCEL): -5.4 MMHG
BH CV ECHO MEAS - PA V2 MAX: 104.5 CM/SEC
BH CV ECHO MEAS - PULM A REVS DUR: 0.1 SEC
BH CV ECHO MEAS - PULM A REVS VEL: 37.2 CM/SEC
BH CV ECHO MEAS - PULM DIAS VEL: 40.3 CM/SEC
BH CV ECHO MEAS - PULM S/D: 1.5
BH CV ECHO MEAS - PULM SYS VEL: 61.6 CM/SEC
BH CV ECHO MEAS - PVA(V,A): 2.8 CM^2
BH CV ECHO MEAS - PVA(V,D): 2.8 CM^2
BH CV ECHO MEAS - QP/QS: 0.67
BH CV ECHO MEAS - RAP SYSTOLE: 3 MMHG
BH CV ECHO MEAS - RV MAX PG: 2.2 MMHG
BH CV ECHO MEAS - RV MEAN PG: 1.1 MMHG
BH CV ECHO MEAS - RV V1 MAX: 73.7 CM/SEC
BH CV ECHO MEAS - RV V1 MEAN: 48.3 CM/SEC
BH CV ECHO MEAS - RV V1 VTI: 11.9 CM
BH CV ECHO MEAS - RVOT AREA: 3.9 CM^2
BH CV ECHO MEAS - RVOT DIAM: 2.2 CM
BH CV ECHO MEAS - RVSP: 20 MMHG
BH CV ECHO MEAS - SI(AO): 158.8 ML/M^2
BH CV ECHO MEAS - SI(CUBED): 35 ML/M^2
BH CV ECHO MEAS - SI(LVOT): 29.9 ML/M^2
BH CV ECHO MEAS - SI(MOD-SP2): 26.9 ML/M^2
BH CV ECHO MEAS - SI(MOD-SP4): 22.7 ML/M^2
BH CV ECHO MEAS - SI(TEICH): 32 ML/M^2
BH CV ECHO MEAS - SUP REN AO DIAM: 2.3 CM
BH CV ECHO MEAS - SV(AO): 371.3 ML
BH CV ECHO MEAS - SV(CUBED): 81.8 ML
BH CV ECHO MEAS - SV(LVOT): 69.8 ML
BH CV ECHO MEAS - SV(MOD-SP2): 63 ML
BH CV ECHO MEAS - SV(MOD-SP4): 53 ML
BH CV ECHO MEAS - SV(RVOT): 46.5 ML
BH CV ECHO MEAS - SV(TEICH): 74.9 ML
BH CV ECHO MEAS - TAPSE (>1.6): 2.3 CM
BH CV ECHO MEAS - TR MAX VEL: 205.5 CM/SEC
BH CV ECHO MEASUREMENTS AVERAGE E/E' RATIO: 6.94
BH CV XLRA - RV BASE: 3.9 CM
BH CV XLRA - RV LENGTH: 7 CM
BH CV XLRA - RV MID: 2.7 CM
BH CV XLRA - TDI S': 14.5 CM/SEC
LEFT ATRIUM VOLUME INDEX: 30 ML/M2
MAXIMAL PREDICTED HEART RATE: 161 BPM
SINUS: 4.1 CM
STJ: 3.6 CM
STRESS TARGET HR: 137 BPM

## 2021-03-15 RX ORDER — METOPROLOL SUCCINATE 25 MG/1
25 TABLET, EXTENDED RELEASE ORAL NIGHTLY
Qty: 90 TABLET | Refills: 3 | Status: SHIPPED | OUTPATIENT
Start: 2021-03-15 | End: 2022-02-24

## 2021-04-08 ENCOUNTER — TELEPHONE (OUTPATIENT)
Dept: CARDIOLOGY | Facility: CLINIC | Age: 61
End: 2021-04-08

## 2021-04-08 ENCOUNTER — HOSPITAL ENCOUNTER (OUTPATIENT)
Dept: CARDIOLOGY | Facility: HOSPITAL | Age: 61
Discharge: HOME | End: 2021-04-08
Admitting: INTERNAL MEDICINE

## 2021-04-08 DIAGNOSIS — I71.20 THORACIC AORTIC ANEURYSM WITHOUT RUPTURE (HCC): ICD-10-CM

## 2021-04-08 DIAGNOSIS — I25.10 CORONARY ARTERY CALCIFICATION: ICD-10-CM

## 2021-04-08 DIAGNOSIS — I25.84 CORONARY ARTERY CALCIFICATION: ICD-10-CM

## 2021-04-08 LAB
BH CV STRESS BP STAGE 1: NORMAL
BH CV STRESS BP STAGE 2: NORMAL
BH CV STRESS BP STAGE 3: NORMAL
BH CV STRESS DURATION MIN STAGE 1: 3
BH CV STRESS DURATION MIN STAGE 2: 3
BH CV STRESS DURATION MIN STAGE 3: 3
BH CV STRESS DURATION SEC STAGE 1: 0
BH CV STRESS DURATION SEC STAGE 2: 0
BH CV STRESS DURATION SEC STAGE 3: 0
BH CV STRESS GRADE STAGE 1: 10
BH CV STRESS GRADE STAGE 2: 12
BH CV STRESS GRADE STAGE 3: 14
BH CV STRESS HR STAGE 1: 109
BH CV STRESS HR STAGE 2: 126
BH CV STRESS HR STAGE 3: 150
BH CV STRESS METS STAGE 1: 5
BH CV STRESS METS STAGE 2: 7.5
BH CV STRESS METS STAGE 3: 10
BH CV STRESS PROTOCOL 1: NORMAL
BH CV STRESS RECOVERY BP: NORMAL MMHG
BH CV STRESS RECOVERY HR: 99 BPM
BH CV STRESS SPEED STAGE 1: 1.7
BH CV STRESS SPEED STAGE 2: 2.5
BH CV STRESS SPEED STAGE 3: 3.4
BH CV STRESS STAGE 1: 1
BH CV STRESS STAGE 2: 2
BH CV STRESS STAGE 3: 3
MAXIMAL PREDICTED HEART RATE: 161 BPM
PERCENT MAX PREDICTED HR: 93.17 %
STRESS BASELINE BP: NORMAL MMHG
STRESS BASELINE HR: 90 BPM
STRESS PERCENT HR: 110 %
STRESS POST ESTIMATED WORKLOAD: 10 METS
STRESS POST EXERCISE DUR MIN: 9 MIN
STRESS POST EXERCISE DUR SEC: 0 SEC
STRESS POST PEAK BP: NORMAL MMHG
STRESS POST PEAK HR: 150 BPM
STRESS TARGET HR: 137 BPM

## 2021-04-08 PROCEDURE — 93016 CV STRESS TEST SUPVJ ONLY: CPT | Performed by: INTERNAL MEDICINE

## 2021-04-08 PROCEDURE — 93017 CV STRESS TEST TRACING ONLY: CPT

## 2021-04-08 PROCEDURE — 93018 CV STRESS TEST I&R ONLY: CPT | Performed by: INTERNAL MEDICINE

## 2021-04-09 NOTE — TELEPHONE ENCOUNTER
Results reviewed and patient verbalized understanding. Denies further questions.    Lydia Gonzalez RN  [place] [facility] [place]

## 2021-11-23 NOTE — TELEPHONE ENCOUNTER
CTA is in your in box for review    
Get cta chest from [facility] imaging - just need report - I did call and gave echo results.     rm  
Requested report spoke with Colleen  
round/brisk

## 2022-02-24 ENCOUNTER — OFFICE VISIT (OUTPATIENT)
Dept: CARDIOLOGY | Facility: CLINIC | Age: 62
End: 2022-02-24

## 2022-02-24 VITALS
BODY MASS INDEX: 32.76 KG/M2 | HEART RATE: 69 BPM | SYSTOLIC BLOOD PRESSURE: 152 MMHG | DIASTOLIC BLOOD PRESSURE: 90 MMHG | WEIGHT: 234 LBS | HEIGHT: 71 IN

## 2022-02-24 DIAGNOSIS — I25.84 CORONARY ARTERY CALCIFICATION: Primary | ICD-10-CM

## 2022-02-24 DIAGNOSIS — G47.33 OSA (OBSTRUCTIVE SLEEP APNEA): ICD-10-CM

## 2022-02-24 DIAGNOSIS — I10 ESSENTIAL HYPERTENSION: ICD-10-CM

## 2022-02-24 DIAGNOSIS — E78.2 MIXED HYPERLIPIDEMIA: ICD-10-CM

## 2022-02-24 DIAGNOSIS — I25.10 CORONARY ARTERY CALCIFICATION: Primary | ICD-10-CM

## 2022-02-24 PROBLEM — R91.1 PULMONARY NODULE: Status: RESOLVED | Noted: 2018-11-06 | Resolved: 2022-02-24

## 2022-02-24 PROBLEM — R91.1 PULMONARY NODULE: Status: ACTIVE | Noted: 2018-11-06

## 2022-02-24 PROCEDURE — 93000 ELECTROCARDIOGRAM COMPLETE: CPT | Performed by: NURSE PRACTITIONER

## 2022-02-24 PROCEDURE — 99214 OFFICE O/P EST MOD 30 MIN: CPT | Performed by: NURSE PRACTITIONER

## 2022-02-24 RX ORDER — AMLODIPINE BESYLATE 10 MG/1
1 TABLET ORAL DAILY
COMMUNITY
Start: 2022-02-09

## 2022-02-24 RX ORDER — MONTELUKAST SODIUM 10 MG/1
TABLET ORAL
COMMUNITY
Start: 2022-02-09

## 2022-02-24 RX ORDER — METOPROLOL SUCCINATE 50 MG/1
1 TABLET, EXTENDED RELEASE ORAL DAILY
COMMUNITY
Start: 2022-02-22

## 2022-02-24 RX ORDER — LOSARTAN POTASSIUM 100 MG/1
1 TABLET ORAL DAILY
COMMUNITY
Start: 2022-02-21

## 2022-02-24 NOTE — PROGRESS NOTES
Date of Office Visit: 2022  Encounter Provider: MCKENZIE Ha  Place of Service: [facility] CARDIOLOGY  Patient Name: Melchor Conrad Jr.  :1960  Primary Cardiologist: Dr. Lila Otero    Chief Complaint   Patient presents with   • Coronary artery calcification   • Follow-up   :     HPI: Melchor Conrad Jr. is a pleasant 61 y.o. male who presents today for follow-up on coronary artery calcification and risk factor modification.  He is a new patient to me and I have reviewed his medical records.    He has been diagnosed with hypertension, hyperlipidemia, and obstructive sleep apnea (compliant with dental device).  He quit cigarette smoking in .  He works as a  as an .  There is no family history of premature cardiovascular disease.    In 2020, he had a CT coronary calcium score which showed 0 left main, 312 LAD, 102 left circumflex, 113 RCA, and a total score of 527 at [place].  He also was noted to have a fusiform ascending aortic aneurysm measuring 4.2 cm.  Dr. Otero to recommended that he continue rosuvastatin and a goal blood pressure of less than 120/80.  In 2021, he had a CTA of the chest which showed ascending aorta measuring 4.1 cm, old granulomatosis disease in the right upper lobe, 5 mm pulmonary nodule, and mild fatty infiltration of the liver and hepatic cyst.  She recommended beta-blocker therapy if his aorta was dilated.  She wanted him to remain on rosuvastatin because of the coronary artery calcification.    He presents today for follow-up visit.  He denies chest pain, shortness of air, palpitations, edema, dizziness, syncope, or bleeding.  His blood pressure is elevated today.  He saw his PCP on Tuesday and his metoprolol was increased to 50 mg daily.  His hydrochlorothiazide was recently discontinued because of erectile dysfunction and he was well controlled on Edarbi in the past, but the  medication was too expensive.  He is no longer taking the rosuvastatin because his cholesterol has been well controlled.  He is eating a plant-based diet and exercising frequently throughout the week.    Past Medical History:   Diagnosis Date   • Elbow fracture 2014   • HLD (hyperlipidemia) 2016   • HTN (hypertension), benign 2016   • MADHU (obstructive sleep apnea) 2017    Uses dental device   • Radial head fracture 2014       Past Surgical History:   Procedure Laterality Date   • HAND RECONSTRUCTION Right    • HEMORRHOIDECTOMY     • KNEE MENISCAL REPAIR Right 2018   • SHOULDER SURGERY Left    • SPINAL FUSION  2011    Lower Back   • VASECTOMY         Social History     Socioeconomic History   • Marital status:    Tobacco Use   • Smoking status: Former Smoker     Packs/day: 1.50     Years: 6.00     Pack years: 9.00     Quit date:      Years since quittin.1   • Smokeless tobacco: Never Used   • Tobacco comment: Caffeine use: 2 cups daily   Substance and Sexual Activity   • Alcohol use: Yes     Alcohol/week: 7.0 standard drinks     Types: 7 Standard drinks or equivalent per week     Comment: Weekend/tequilla   • Drug use: No   • Sexual activity: Defer       Family History   Problem Relation Age of Onset   • Hypertension Mother    • Osteoarthritis Mother    • Prostate cancer Father    • Alzheimer's disease Father         SDAT   • Ulcers Father         PEPTIC   • Breast cancer Maternal Grandmother    • Heart disease Maternal Grandfather    • Stroke Paternal Grandfather        The following portion of the patient's history were reviewed and updated as appropriate: past medical history, past surgical history, past social history, past family history, allergies, current medications, and problem list.    Review of Systems   Constitutional: Negative.   Cardiovascular: Negative.    Respiratory: Negative.    Hematologic/Lymphatic: Negative.    Neurological: Negative.        No Known  "Allergies      Current Outpatient Medications:   •  albuterol sulfate  (90 Base) MCG/ACT inhaler, Inhale 2 puffs Every 4 (Four) Hours As Needed for Wheezing., Disp: 1 inhaler, Rfl: 0  •  amLODIPine (NORVASC) 10 MG tablet, Take 1 tablet by mouth Daily., Disp: , Rfl:   •  Ascorbic Acid (VITAMIN C PO), Take  by mouth daily., Disp: , Rfl:   •  aspirin 81 MG EC tablet, Take 81 mg by mouth daily., Disp: , Rfl:   •  benzonatate (TESSALON) 100 MG capsule, Take 100 mg by mouth 3 (Three) Times a Day As Needed for Cough., Disp: , Rfl:   •  diphenhydrAMINE (BENADRYL) 25 mg capsule, Take 50 mg by mouth every night., Disp: , Rfl:   •  GARLIC PO, Take  by mouth daily., Disp: , Rfl:   •  losartan (COZAAR) 100 MG tablet, Take 1 tablet by mouth Daily., Disp: , Rfl:   •  metoprolol succinate XL (TOPROL-XL) 50 MG 24 hr tablet, Take 1 tablet by mouth Daily.  , Disp: , Rfl:   •  montelukast (SINGULAIR) 10 MG tablet, TAKE 1 TABLET BY MOUTH EACH MORNING, Disp: , Rfl:   •  Multiple Vitamins-Minerals (MULTIVITAL PO), Take  by mouth., Disp: , Rfl:         Objective:     Vitals:    02/24/22 1101 02/24/22 1115   BP: (!) 154/102 152/90   BP Location: Left arm Right arm   Pulse: 69    Weight: 106 kg (234 lb)    Height: 180.3 cm (71\")      Body mass index is 32.64 kg/m².    PHYSICAL EXAM:    Vitals Reviewed.   General Appearance: No acute distress, well developed and well nourished. Obese.   Eyes: Conjunctiva and lids: No erythema, swelling, or discharge. Sclera non-icteric. Glasses.   HENT: Atraumatic, normocephalic. External eyes, ears, and nose normal. No hearing loss noted. Mucous membranes normal. Lips not cyanotic. Neck supple with no tenderness. Wearing mask.   Respiratory: No signs of respiratory distress. Respiration rhythm and depth normal.   Clear to auscultation. No rales, crackles, rhonchi, or wheezing auscultated.   Cardiovascular:  Jugular Venous Pressure: Normal  Heart Rate and Rhythm: Normal, Heart Sounds: Normal S1 and S2. " No S3 or S4 noted.  Murmurs: No murmurs noted. No rubs, thrills, or gallops.   Arterial Pulses: Carotid pulses normal. No carotid bruit noted.    Lower Extremities: No edema noted.  Gastrointestinal:  Abdomen soft, non-distended, non-tender.    Musculoskeletal: Normal movement of extremities.  Skin and Nails: General appearance normal. No pallor, cyanosis, diaphoresis. Skin temperature normal. No clubbing of fingernails.   Psychiatric: Patient alert and oriented to person, place, and time. Speech and behavior appropriate. Normal mood and affect.       ECG 12 Lead    Date/Time: 2/24/2022 11:05 AM  Performed by: Monique Randall APRN  Authorized by: Monique Randall APRN   Comparison: compared with previous ECG from 2/24/2022  Similar to previous ECG  Rhythm: sinus rhythm  Rate: normal  BPM: 69  Conduction: conduction normal  ST Segments: ST segments normal  T Waves: T waves normal  QRS axis: normal  Other findings: left ventricular hypertrophy    Clinical impression: non-specific ECG              Assessment:       Diagnosis Plan   1. Coronary artery calcification     2. Essential hypertension     3. Mixed hyperlipidemia     4. MADHU (obstructive sleep apnea)            Plan:       1.  Coronary Artery Calcification: CT calcium score was 527.  He exercises frequently so stress testing was not completed.  He denies anginal symptoms.  Dr. Otero recommended that he continue with aspirin and rosuvastatin.  He has stopped the rosuvastatin on his own accord because his cholesterol was well controlled.  I explained that we are using this cholesterol medication for plaque stabilization and secondary prevention.  He will have a repeat lipid panel completed at his PCP office and discuss with his PCP at that time.    2/3.  Hypertension: This is a major risk factor for him and he was noted to have mild LVH on EKG and echocardiogram.  I have low suspicion that he is going to get much better blood pressure control by increasing  the beta-blocker.  Edarbi was too expensive and HCTZ was discontinued because of erectile dysfunction.  He is on the maximum dosage of amlodipine and losartan.  He exercises frequently.  I would suggest adding spironolactone 25 mg daily to his regimen.    3.  Hyperlipidemia: I really think he needs to be on statin therapy and explained the benefits to the patient.    4.  Obstructive Sleep Apnea: Uses a dental device.    5.  I recommended follow-up with Dr. Lila Otero in 1 year.      As always, it has been a pleasure to participate in your patient's care. Thank you.       Sincerely,         MCKENZIE Dixon  [facility] Cardiology      · Dictated utilizing Dragon Dictation  · COVID-19 Precautions - Patient was compliant in wearing a mask. When I saw the patient, I used appropriate personal protective equipment (PPE) including mask and eye shield (standard procedure).  Additionally, I used gown and gloves if indicated.  Hand hygiene was completed before and after seeing the patient.